# Patient Record
Sex: FEMALE | Race: WHITE | Employment: OTHER | URBAN - METROPOLITAN AREA
[De-identification: names, ages, dates, MRNs, and addresses within clinical notes are randomized per-mention and may not be internally consistent; named-entity substitution may affect disease eponyms.]

---

## 2017-07-14 ENCOUNTER — HOSPITAL ENCOUNTER (OUTPATIENT)
Dept: PHYSICAL THERAPY | Age: 62
Discharge: HOME | End: 2017-07-14
Payer: COMMERCIAL

## 2017-07-14 PROCEDURE — 97163 PT EVAL HIGH COMPLEX 45 MIN: CPT

## 2017-07-14 PROCEDURE — 97110 THERAPEUTIC EXERCISES: CPT

## 2017-07-14 NOTE — PROGRESS NOTES
PT DAILY TREATMENT NOTE     Patient Name: Kamari Oliveros  Date:2017  : 1955  [x]  Patient  Verified  Payor: BLUE CROSS / Plan: [address] [place] [address] / Product Type: PPO /    In time:1200  Out time:100  Total Treatment Time (min): 60  Visit #: 1 of 12    Treatment Area: Displaced supracondylar fracture with intracondylar extension of lower end of left femur, subsequent encounter for closed fracture with routine healing [S72.369S]  Displaced fracture of posterior wall of right acetabulum, subsequent encounter for fracture with routine healing [S34.615M]    SUBJECTIVE  Pain Level (0-10 scale): 5-6  Any medication changes, allergies to medications, adverse drug reactions, diagnosis change, or new procedure performed?: [x] No    [] Yes (see summary sheet for update)  Subjective functional status/changes:   [] No changes reported      OBJECTIVE    Modality rationale:    Min Type Additional Details    [] Estim:  []Unatt       []IFC  []Premod                        []Other:  []w/ice   []w/heat  Position:  Location:    [] Estim: []Att    []TENS instruct  []NMES                    []Other:  []w/US   []w/ice   []w/heat  Position:  Location:    []  Traction: [] Cervical       []Lumbar                       [] Prone          []Supine                       []Intermittent   []Continuous Lbs:  [] before manual  [] after manual    []  Ultrasound: []Continuous   [] Pulsed                           []1MHz   []3MHz W/cm2:  Location:    []  Iontophoresis with dexamethasone         Location: [] Take home patch   [] In clinic    []  Ice     []  heat  []  Ice massage  []  Laser   []  Anodyne Position:  Location:    []  Laser with stim  []  Other:  Position:  Location:    []  Vasopneumatic Device Pressure:       [] lo [] med [] hi   Temperature: [] lo [] med [] hi   [] Skin assessment post-treatment:  []intact []redness- no adverse reaction    []redness  adverse reaction:     50 min [x]Eval []Re-Eval       10 min Therapeutic Exercise:  [] See flow sheet :HEP   Rationale: increase ROM and increase strength to improve the patients ability to prepare for ambulation     With   [] TE   [] TA   [] neuro   [] other: Patient Education: [x] Review HEP    [] Progressed/Changed HEP based on:   [] positioning   [] body mechanics   [] transfers   [] heat/ice application    [] other:      Other Objective/Functional Measures:      Pain Level (0-10 scale) post treatment: 5-6    ASSESSMENT/Changes in Function:     Patient will continue to benefit from skilled PT services to modify and progress therapeutic interventions, address functional mobility deficits, address ROM deficits, address strength deficits, analyze and address soft tissue restrictions, analyze and cue movement patterns, analyze and modify body mechanics/ergonomics, assess and modify postural abnormalities, address imbalance/dizziness and instruct in home and community integration to attain remaining goals.      [x]  See Plan of Care  []  See progress note/recertification  []  See Discharge Summary         Progress towards goals / Updated goals:  See POC    PLAN  []  Upgrade activities as tolerated     [x]  Continue plan of care  []  Update interventions per flow sheet       []  Discharge due to:_  []  Other:_      Armen Montoya, PT 7/14/2017  1:09 PM    Future Appointments  Date Time Provider Lukasz Torres   7/17/2017 4:30  [address] [facility]   7/21/2017 11:30  [address] [facility]   7/24/2017 11:30  [address] [facility]   7/28/2017 11:30  [address] [facility]   7/31/2017 12:00 PM Armen Montoya PT [facility]

## 2017-07-14 NOTE — MR AVS SNAPSHOT
Visit Information Date & Time Provider Department Dept. Phone Encounter #  
 7/14/2017 12:00 PM Polina Briseno,  [address]  [phone] [id number] Upcoming Health Maintenance Date Due Hepatitis C Screening 1955 DTaP/Tdap/Td series (1 - Tdap) 9/16/1975 BREAST CANCER SCRN MAMMOGRAM 9/16/2004 FOBT Q 1 YEAR AGE 50-75 9/16/2004 PAP AKA CERVICAL CYTOLOGY 9/12/2014 ZOSTER VACCINE AGE 60> 9/16/2014 INFLUENZA AGE 9 TO ADULT 8/1/2017 Allergies as of 7/14/2017  Review Complete On: 12/8/2015 By: Todd Maldonado MD  
  
 Severity Noted Reaction Type Reactions Sulfa (Sulfonamide Antibiotics)  12/08/2015    Nausea and Vomiting Current Immunizations  Never Reviewed No immunizations on file. Not reviewed this visit Vitals OB Status Smoking Status Menopause Never Smoker Your Updated Medication List  
  
Notice Cannot display patient medications because the patient has not yet been checked in. To-Do List   
 07/14/2017 12:00 PM  
  Appointment with Polina Briseno, PT at [facility] ([phone]) Introducing [facility]! [address] introduces [employer] patient portal. Now you can access parts of your medical record, email your doctor's office, and request medication refills online. 1. In your internet browser, go to [email or web address] 2. Click on the First Time User? Click Here link in the Sign In box. You will see the New Member Sign Up page. 3. Enter your [employer] Access Code exactly as it appears below. You will not need to use this code after youve completed the sign-up process. If you do not sign up before the expiration date, you must request a new code. · [employer] Access Code: [email or web address] Expires: 10/10/2017 10:55 AM 
 
4.  Enter the last four digits of your Social Security Number (xxxx) and Date of Birth (mm/dd/yyyy) as indicated and click Submit. You will be taken to the next sign-up page. 5. Create a [employer] ID. This will be your [employer] login ID and cannot be changed, so think of one that is secure and easy to remember. 6. Create a [employer] password. You can change your password at any time. 7. Enter your Password Reset Question and Answer. This can be used at a later time if you forget your password. 8. Enter your e-mail address. You will receive e-mail notification when new information is available in [address] 9. Click Sign Up. You can now view and download portions of your medical record. 10. Click the Download Summary menu link to download a portable copy of your medical information. If you have questions, please visit the Frequently Asked Questions section of the [employer] website. Remember, [employer] is NOT to be used for urgent needs. For medical emergencies, dial 911. Now available from your iPhone and Android! Please provide this summary of care documentation to your next provider. If you have any questions after today's visit, please call [phone].
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
## 2017-07-17 ENCOUNTER — APPOINTMENT (OUTPATIENT)
Dept: PHYSICAL THERAPY | Age: 62
End: 2017-07-17
Payer: COMMERCIAL

## 2017-07-21 ENCOUNTER — HOSPITAL ENCOUNTER (OUTPATIENT)
Dept: PHYSICAL THERAPY | Age: 62
Discharge: HOME | End: 2017-07-21
Payer: COMMERCIAL

## 2017-07-21 PROCEDURE — 97110 THERAPEUTIC EXERCISES: CPT

## 2017-07-21 NOTE — PROGRESS NOTES
PT DAILY TREATMENT NOTE     Patient Name: Bell Montez  Date:2017  : 1955  [x]  Patient  Verified  Payor: [employer] / Plan: [address] [place] [address] / Product Type: PPO /    In time:11:30  Out time:12:15  Total Treatment Time (min): 45  Visit #: 2 of 12    Treatment Area: Displaced supracondylar fracture with intracondylar extension of lower end of left femur, subsequent encounter for closed fracture with routine healing [S73.101I]  Displaced fracture of posterior wall of right acetabulum, subsequent encounter for fracture with routine healing [S36.564D]    SUBJECTIVE  Pain Level (0-10 scale): 5  Any medication changes, allergies to medications, adverse drug reactions, diagnosis change, or new procedure performed?: [x] No    [] Yes (see summary sheet for update)  Subjective functional status/changes:   [] No changes reported  I'm having spasms in my legs    OBJECTIVE    45 min Therapeutic Exercise:  [] See flow sheet :   Rationale: increase strength and improve coordination to improve the patients ability to improve WB tolerance at 20%, improve ease of mobility, prepare for increased WB          With   [] TE   [] TA   [] neuro   [] other: Patient Education: [x] Review HEP    [] Progressed/Changed HEP based on:   [] positioning   [] body mechanics   [] transfers   [] heat/ice application    [] other:      Other Objective/Functional Measures: initiated ex program     Pain Level (0-10 scale) post treatment:  4    ASSESSMENT/Changes in Function: first follow-up after eval.  Pt able to stand with UE support in bars for short duration to allow for LE ex's. Therapist provided minimal support to assisit with standing as pt's UE's fatigued.       Patient will continue to benefit from skilled PT services to modify and progress therapeutic interventions, address functional mobility deficits, address ROM deficits, address strength deficits, analyze and address soft tissue restrictions, analyze and cue movement patterns, analyze and modify body mechanics/ergonomics, assess and modify postural abnormalities, address imbalance/dizziness and instruct in home and community integration to attain remaining goals. []  See Plan of Care  []  See progress note/recertification  []  See Discharge Summary         Progress towards goals / Updated goals:  Goal: Patient will be independent and compliant with HEP in order to improve LE strength. Status at last note/certification: Issued and reviewed  Long Term Goals: To be accomplished in 6 weeks:  Goal: Patient will improve FOTO assessment score to 54 in order to indicate improved functional abilities. Status at last note/certification: 35  Goal: Patient will be able to ambulate with walker community distances without pain increasing over 4/10 in order to progress toward normalized gait. Status at last note/certification: unable to ambulate currently  Goal: Patient will improve bilateral LE strength to 5/5 without pain in order to prepare for full weight bearing. Status at last note/certification: Grossly 4+/5 to 5/5 with pain  Goal: Patient will report worst LE pain as 5/10 or less in order to improve sleeping tolerance.   Status at last note/certification: 3/65    PLAN  [x]  Upgrade activities as tolerated     []  Continue plan of care  []  Update interventions per flow sheet       []  Discharge due to:_  []  Other:_      Shari Portillo, RYAN 7/21/2017  12:29 PM    Future Appointments  Date Time Provider Lukasz Torres   7/24/2017 11:30  [address] [facility]   7/28/2017 11:30  [address] [facility]   7/31/2017 12:00 PM Wong4 Ann Marie Philip

## 2017-07-24 ENCOUNTER — HOSPITAL ENCOUNTER (OUTPATIENT)
Dept: PHYSICAL THERAPY | Age: 62
Discharge: HOME | End: 2017-07-24
Payer: COMMERCIAL

## 2017-07-24 PROCEDURE — 97110 THERAPEUTIC EXERCISES: CPT

## 2017-07-24 PROCEDURE — 97112 NEUROMUSCULAR REEDUCATION: CPT

## 2017-07-24 NOTE — PROGRESS NOTES
PT DAILY TREATMENT NOTE     Patient Name: Humberto Herman  Date:2017  : 1955  [x]  Patient  Verified  Payor: BLUE CROSS / Plan: [address] [place] [address] / Product Type: PPO /    In time:11;40  Out time:12;35  Total Treatment Time (min): 54  Visit #: 3 of 12    Treatment Area: Displaced supracondylar fracture with intracondylar extension of lower end of left femur, subsequent encounter for closed fracture with routine healing [S72.469Z]  Displaced fracture of posterior wall of right acetabulum, subsequent encounter for fracture with routine healing [S32.421D]    SUBJECTIVE  Pain Level (0-10 scale): 6  Any medication changes, allergies to medications, adverse drug reactions, diagnosis change, or new procedure performed?: [x] No    [] Yes (see summary sheet for update)  Subjective functional status/changes:   [] No changes reported  I feel good after PT, makes mylegs feel good. OBJECTIVE    45 min Therapeutic Exercise:  [] See flow sheet :   Rationale: increase strength to improve the patients ability to improve ease of transfers, ADL's    10 min Neuromuscular Re-education:  []  See flow sheet :   Rationale: increase strength, improve coordination, improve balance and increase proprioception  to improve the patients ability to increase WB tolerance for gait, transers          With   [] TE   [] TA   [] neuro   [] other: Patient Education: [x] Review HEP    [] Progressed/Changed HEP based on:   [] positioning   [] body mechanics   [] transfers   [] heat/ice application    [] other:      Other Objective/Functional Measures:   Minisquats, lunges   Lowered bars for improved posture and UE support    Pain Level (0-10 scale) post treatment: 0    ASSESSMENT/Changes in Function: Increased WB tolerance in bars with UE support. Able to take 2-3 steps with no reported pain.     Patient will continue to benefit from skilled PT services to modify and progress therapeutic interventions, address functional mobility deficits, address ROM deficits, address strength deficits, analyze and address soft tissue restrictions, analyze and cue movement patterns, analyze and modify body mechanics/ergonomics, assess and modify postural abnormalities, address imbalance/dizziness and instruct in home and community integration to attain remaining goals. []  See Plan of Care  []  See progress note/recertification  []  See Discharge Summary         Progress towards goals / Updated goals:  Goal: Patient will be independent and compliant with HEP in order to improve LE strength. Status at last note/certification: Issued and reviewed  Goal Met  Long Term Goals: To be accomplished in 6 weeks:  Goal: Patient will improve FOTO assessment score to 54 in order to indicate improved functional abilities. Status at last note/certification: 35  Goal: Patient will be able to ambulate with walker community distances without pain increasing over 4/10 in order to progress toward normalized gait. Status at last note/certification: unable to ambulate currently  Goal: Patient will improve bilateral LE strength to 5/5 without pain in order to prepare for full weight bearing. Status at last note/certification: Grossly 4+/5 to 5/5 with pain  Goal: Patient will report worst LE pain as 5/10 or less in order to improve sleeping tolerance.   Status at last note/certification: 3/46    PLAN  [x]  Upgrade activities as tolerated     []  Continue plan of care  []  Update interventions per flow sheet       []  Discharge due to:_  []  Other:_      Usman Knox, RYAN 7/24/2017  11:58 AM    Future Appointments  Date Time Provider Lukasz Torres   7/28/2017 11:30  [address] [facility]   7/31/2017 12:00  Ann Marie Easley

## 2017-07-28 ENCOUNTER — HOSPITAL ENCOUNTER (OUTPATIENT)
Dept: PHYSICAL THERAPY | Age: 62
Discharge: HOME | End: 2017-07-28
Payer: COMMERCIAL

## 2017-07-28 PROCEDURE — 97110 THERAPEUTIC EXERCISES: CPT

## 2017-07-28 PROCEDURE — 97112 NEUROMUSCULAR REEDUCATION: CPT

## 2017-07-28 PROCEDURE — 97530 THERAPEUTIC ACTIVITIES: CPT

## 2017-07-28 NOTE — PROGRESS NOTES
PT DAILY TREATMENT NOTE     Patient Name: Nicholas Griffith  Date:2017  : 1955  [x]  Patient  Verified  Payor: [employer] [place] / Plan: [address] [place] [address] / Product Type: PPO /    In time:11:30  Out time:12:30  Total Treatment Time (min): 60  Visit #: 4 of 12    Treatment Area: Displaced supracondylar fracture with intracondylar extension of lower end of left femur, subsequent encounter for closed fracture with routine healing [S72.710R]  Displaced fracture of posterior wall of right acetabulum, subsequent encounter for fracture with routine healing [S32.421D]    SUBJECTIVE  Pain Level (0-10 scale): 4  Any medication changes, allergies to medications, adverse drug reactions, diagnosis change, or new procedure performed?: [x] No    [] Yes (see summary sheet for update)  Subjective functional status/changes:   [] No changes reported  I've been standing more at home and was able to reach and get a bag of sugar out of the cabinet    OBJECTIVE    30 min Therapeutic Exercise:  [] See flow sheet :   Rationale: increase strength to improve the patients ability to walk and perfrom daily tasks with ease. 15 min Therapeutic Activity:  []  See flow sheet :   Rationale: increase strength, improve coordination and improve balance  to improve the patients ability to progress to use of RW for mobility in home, community, ease of ADL function     15 min Neuromuscular Re-education:  []  See flow sheet :   Rationale: increase strength, improve coordination, improve balance and increase proprioception  to improve the patients ability to weight shift and ambulate with ease,       With   [] TE   [] TA   [] neuro   [] other: Patient Education: [x] Review HEP    [] Progressed/Changed HEP based on:   [] positioning   [] body mechanics   [] transfers   [] heat/ice application    [] other:      Other Objective/Functional Measures: Added: step ups 4 inch with UE support, FW and lateral walking in bars.   Frankie and MSR with EO, 30 seconds  Ambulation with RW 50 feet x 2, 1 rest.  SBA and cues for correct stepping pattern     Pain Level (0-10 scale) post treatment:  0    ASSESSMENT/Changes in Function: Pt progressed to WBAT for ambulating with RW and no noted instability or pain. Initially step to pattern due to unfamiliarity with RW, progressed to step through. Patient will continue to benefit from skilled PT services to modify and progress therapeutic interventions, address functional mobility deficits, address ROM deficits, address strength deficits, analyze and address soft tissue restrictions, analyze and cue movement patterns, analyze and modify body mechanics/ergonomics and assess and modify postural abnormalities to attain remaining goals. []  See Plan of Care  []  See progress note/recertification  []  See Discharge Summary         Progress towards goals / Updated goals:  Goal: Patient will be independent and compliant with HEP in order to improve LE strength. Status at last note/certification: Issued and reviewed  Goal Met  Long Term Goals: To be accomplished in 6 weeks:  Goal: Patient will improve FOTO assessment score to 54 in order to indicate improved functional abilities. Status at last note/certification: 35  Goal: Patient will be able to ambulate with walker community distances without pain increasing over 4/10 in order to progress toward normalized gait. Status at last note/certification: unable to ambulate currently  Goal: Patient will improve bilateral LE strength to 5/5 without pain in order to prepare for full weight bearing. Status at last note/certification: Grossly 4+/5 to 5/5 with pain  Goal: Patient will report worst LE pain as 5/10 or less in order to improve sleeping tolerance.   Status at last note/certification: 2/64    PLAN  [x]  Upgrade activities as tolerated     []  Continue plan of care  []  Update interventions per flow sheet       []  Discharge due to:_  []  Other:_ Bryan Knight, PTA 7/28/2017  12:49 PM    Future Appointments  Date Time Provider Lukasz Torres   7/31/2017 12:00 PM Jack Rutherford, PT [facility]   8/4/2017 11:30  [address] [facility]   8/7/2017 11:30 AM Sandhya Maldonado, PT [facility]   8/11/2017 11:30  [address] [facility]

## 2017-07-31 ENCOUNTER — HOSPITAL ENCOUNTER (OUTPATIENT)
Dept: PHYSICAL THERAPY | Age: 62
Discharge: HOME | End: 2017-07-31
Payer: COMMERCIAL

## 2017-07-31 PROCEDURE — 97110 THERAPEUTIC EXERCISES: CPT

## 2017-07-31 PROCEDURE — 97112 NEUROMUSCULAR REEDUCATION: CPT

## 2017-07-31 NOTE — PROGRESS NOTES
PT DAILY TREATMENT NOTE     Patient Name: Belita Litten  Date:2017  : 1955  [x]  Patient  Verified  Payor: BLUE CROSS / Plan: [address] [place] [address] / Product Type: PPO /    In time:1152  Out time:1240  Total Treatment Time (min): 48  Visit #: 5 of 12    Treatment Area: Displaced supracondylar fracture with intracondylar extension of lower end of left femur, subsequent encounter for closed fracture with routine healing [S72.067E]  Displaced fracture of posterior wall of right acetabulum, subsequent encounter for fracture with routine healing [S32.748D]    SUBJECTIVE  Pain Level (0-10 scale): 4  Any medication changes, allergies to medications, adverse drug reactions, diagnosis change, or new procedure performed?: [x] No    [] Yes (see summary sheet for update)  Subjective functional status/changes:   [] No changes reported  I was a little sore on the way over here. I need to get used to standing up tall and keeping my knees straight.      OBJECTIVE    Modality rationale:    Min Type Additional Details    [] Estim:  []Unatt       []IFC  []Premod                        []Other:  []w/ice   []w/heat  Position:  Location:    [] Estim: []Att    []TENS instruct  []NMES                    []Other:  []w/US   []w/ice   []w/heat  Position:  Location:    []  Traction: [] Cervical       []Lumbar                       [] Prone          []Supine                       []Intermittent   []Continuous Lbs:  [] before manual  [] after manual    []  Ultrasound: []Continuous   [] Pulsed                           []1MHz   []3MHz W/cm2:  Location:    []  Iontophoresis with dexamethasone         Location: [] Take home patch   [] In clinic    []  Ice     []  heat  []  Ice massage  []  Laser   []  Anodyne Position:  Location:    []  Laser with stim  []  Other:  Position:  Location:    []  Vasopneumatic Device Pressure:       [] lo [] med [] hi   Temperature: [] lo [] med [] hi   [] Skin assessment post-treatment: []intact []redness- no adverse reaction    []redness  adverse reaction:     15 min Therapeutic Exercise:  [x] See flow sheet :   Rationale: increase ROM and increase strength to improve the patients ability to perform daily tasks    33 min Neuromuscular Re-education:  [x]  See flow sheet :   Rationale: increase strength, improve coordination, improve balance and increase proprioception  to improve the patients ability to improve LE stability with standing, walking      With   [] TE   [] TA   [] neuro   [] other: Patient Education: [x] Review HEP    [] Progressed/Changed HEP based on:   [] positioning   [] body mechanics   [] transfers   [] heat/ice application    [] other:      Other Objective/Functional Measures: added manually resisted TKE     Pain Level (0-10 scale) post treatment: 4    ASSESSMENT/Changes in Function: Patient reports increased Left knee pain with standing on Left LE with UE support. Unable to completely extend left knee in standing during TKE (some swelling noted). Patient remains very motivated. Able to ambulate about 150 feet with Rolling walker without stopping. Patient will continue to benefit from skilled PT services to modify and progress therapeutic interventions, address functional mobility deficits, address ROM deficits, address strength deficits, analyze and address soft tissue restrictions, analyze and cue movement patterns, analyze and modify body mechanics/ergonomics, assess and modify postural abnormalities, address imbalance/dizziness and instruct in home and community integration to attain remaining goals. []  See Plan of Care  []  See progress note/recertification  []  See Discharge Summary         Progress towards goals / Updated goals:  Goal: Patient will be independent and compliant with HEP in order to improve LE strength.   Status at last note/certification: Issued and reviewed  Goal Met  Long Term Goals: To be accomplished in 6 weeks:  Goal: Patient will improve FOTO assessment score to 54 in order to indicate improved functional abilities. Status at last note/certification: 35  Current status: Progressing, 42  Goal: Patient will be able to ambulate with walker community distances without pain increasing over 4/10 in order to progress toward normalized gait. Status at last note/certification: unable to ambulate currently  Current status: Progressing, gently progressing in weight bearing but demonstrates improved standing tolerance  Goal: Patient will improve bilateral LE strength to 5/5 without pain in order to prepare for full weight bearing. Status at last note/certification: Grossly 4+/5 to 5/5 with pain  Current status: Progressing, remains painful  Goal: Patient will report worst LE pain as 5/10 or less in order to improve sleeping tolerance.   Status at last note/certification: 0/17  Current status: Progressing, 5-6/10    PLAN  [x]  Upgrade activities as tolerated     [x]  Continue plan of care  []  Update interventions per flow sheet       []  Discharge due to:_  []  Other:_      Fausto Davis PT 7/31/2017  7:17 AM    Future Appointments  Date Time Provider Lukasz Torres   7/31/2017 12:00 PM Fausto Davis, PT [facility]   8/4/2017 11:30  [address] [facility]   8/7/2017 11:30 AM Sandhya Maldonado, PT [facility]   8/11/2017 11:30  [address] [facility]

## 2017-08-04 ENCOUNTER — HOSPITAL ENCOUNTER (OUTPATIENT)
Dept: PHYSICAL THERAPY | Age: 62
Discharge: HOME | End: 2017-08-04
Payer: COMMERCIAL

## 2017-08-04 PROCEDURE — 97110 THERAPEUTIC EXERCISES: CPT

## 2017-08-04 NOTE — PROGRESS NOTES
PT DAILY TREATMENT NOTE - [facility] 3-16    Patient Name: Reva Bro  Date:2017  : 1955  [x]  Patient  Verified  Payor: BLUE CROSS / Plan: [address] [place] [address] / Product Type: PPO /    In time:11;30  Out time: 12;15  Total Treatment Time (min): 45  Visit #: 4 of 12    Treatment Area: Displaced supracondylar fracture with intracondylar extension of lower end of left femur, subsequent encounter for closed fracture with routine healing [S72.451S]  Displaced fracture of posterior wall of right acetabulum, subsequent encounter for fracture with routine healing [S32.421D]    SUBJECTIVE  Pain Level (0-10 scale): 7  Any medication changes, allergies to medications, adverse drug reactions, diagnosis change, or new procedure performed?: [x] No    [] Yes (see summary sheet for update)  Subjective functional status/changes:   [] No changes reported  I did too much walking the other day, and then I sat in churh for 3 hours. My pain is much more now. OBJECTIVE    45 min Therapeutic Exercise:  [] See flow sheet :   Rationale: increase strength to improve the patients ability to walk with RW and progress to LRAD, perform ADL's      With   [] TE   [] TA   [] neuro   [] other: Patient Education: [x] Review HEP    [] Progressed/Changed HEP based on:   [] positioning   [] body mechanics   [] transfers   [] heat/ice application    [] other:      Other Objective/Functional Measures:    Ambulation in bars as pt felt more comfortable due to pain in legs today. Pain Level (0-10 scale) post treatment: 6    ASSESSMENT/Changes in Function:  Despite pain, pt able to do all ex's in parrallel bars. Requested to walk in bars versus walker.     Patient will continue to benefit from skilled PT services to modify and progress therapeutic interventions, address functional mobility deficits, address ROM deficits, address strength deficits, analyze and address soft tissue restrictions, analyze and cue movement patterns, analyze and modify body mechanics/ergonomics, assess and modify postural abnormalities, address imbalance/dizziness and instruct in home and community integration to attain remaining goals. []  See Plan of Care  []  See progress note/recertification  []  See Discharge Summary         Progress towards goals / Updated goals:  Goal: Patient will be independent and compliant with HEP in order to improve LE strength. Status at last note/certification: Issued and reviewed  Goal Met  Long Term Goals: To be accomplished in 6 weeks:  Goal: Patient will improve FOTO assessment score to 54 in order to indicate improved functional abilities. Status at last note/certification: 35  Current status: Progressing, 42  Goal: Patient will be able to ambulate with walker community distances without pain increasing over 4/10 in order to progress toward normalized gait. Status at last note/certification: unable to ambulate currently  Current status: Progressing, gently progressing in weight bearing but demonstrates improved standing tolerance  Goal: Patient will improve bilateral LE strength to 5/5 without pain in order to prepare for full weight bearing. Status at last note/certification: Grossly 4+/5 to 5/5 with pain  Current status: Progressing, remains painful  Goal: Patient will report worst LE pain as 5/10 or less in order to improve sleeping tolerance.   Status at last note/certification: 9/65  Current status: Progressing, 5-6/10     PLAN  [x]  Upgrade activities as tolerated     []  Continue plan of care  []  Update interventions per flow sheet       []  Discharge due to:_  []  Other:_      Shari Portillo, RYAN 8/4/2017  11:56 AM

## 2017-08-07 ENCOUNTER — HOSPITAL ENCOUNTER (OUTPATIENT)
Dept: PHYSICAL THERAPY | Age: 62
Discharge: HOME | End: 2017-08-07
Payer: COMMERCIAL

## 2017-08-07 PROCEDURE — 97110 THERAPEUTIC EXERCISES: CPT

## 2017-08-07 PROCEDURE — 97112 NEUROMUSCULAR REEDUCATION: CPT

## 2017-08-07 NOTE — PROGRESS NOTES
PT DAILY TREATMENT NOTE     Patient Name: Franky Nolasco  [facility]HU:[id number]  : 1955  [x]  Patient  Verified  Payor: JAQUAN Buxton / Plan: [address] [place] [address] / Product Type: PPO /    In time:11:33  Out time:12:08  Total Treatment Time (min): 35  Visit #: 7 of 12    Treatment Area: Displaced supracondylar fracture with intracondylar extension of lower end of left femur, subsequent encounter for closed fracture with routine healing [S72.465G]  Displaced fracture of posterior wall of right acetabulum, subsequent encounter for fracture with routine healing [S32.421D]    SUBJECTIVE  Pain Level (0-10 scale): 310  Any medication changes, allergies to medications, adverse drug reactions, diagnosis change, or new procedure performed?: [x] No    [] Yes (see summary sheet for update)  Subjective functional status/changes:   [] No changes reported  \"I feel much better than when I was here last time. I did too much standing last week when I was trying to color my hair in the bathroom. I didn't really stand much over the weekend so I feel better today. \"    OBJECTIVE    25 min Therapeutic Exercise:  [] See flow sheet :   Rationale: increase strength to improve the patients ability to increase standing tolerance for ease with ADLs    10 min Neuromuscular Re-education:  []  See flow sheet : gait with RW   Rationale: improve coordination and improve balance  to improve the patients ability to work towards amb without need for wheelchair          With   [] TE   [] TA   [] neuro   [] other: Patient Education: [x] Review HEP    [] Progressed/Changed HEP based on:   [] positioning   [] body mechanics   [] transfers   [] heat/ice application    [] other:      Other Objective/Functional Measures: Continued with progressed program.  Pt able to resume gait using RW today.      Pain Level (0-10 scale) post treatment: 0/10    ASSESSMENT/Changes in Function: Pt exhibited proper sequencing of gait and even WB using RW with SBA.  Pt noted continued movement loosens joints and decreases pain. Patient will continue to benefit from skilled PT services to address functional mobility deficits, address ROM deficits, address strength deficits, analyze and address soft tissue restrictions, analyze and cue movement patterns, analyze and modify body mechanics/ergonomics, assess and modify postural abnormalities, address imbalance/dizziness and instruct in home and community integration to attain remaining goals. []  See Plan of Care  []  See progress note/recertification  []  See Discharge Summary         Progress towards goals / Updated goals:  Short Term Goals: To be accomplished in 1 week:  Goal: Patient will be independent and compliant with HEP in order to improve LE strength. Status at last note/certification: Issued and reviewed  Current stastus: met  Long Term Goals: To be accomplished in 6 weeks:  Goal: Patient will improve FOTO assessment score to 54 in order to indicate improved functional abilities. Status at last note/certification: 35  Current status: progressing - FOTO 42 pts  Goal: Patient will be able to ambulate with walker community distances without pain increasing over 4/10 in order to progress toward normalized gait. Status at last note/certification: unable to ambulate currently  Current status: progressing - able to amb x 150 ft using RW with SBA  Goal: Patient will improve bilateral LE strength to 5/5 without pain in order to prepare for full weight bearing. Status at last note/certification: Grossly 4+/5 to 5/5 with pain  Current status: progressing, remains painful  Goal: Patient will report worst LE pain as 5/10 or less in order to improve sleeping tolerance.   Status at last note/certification: 0/85  Current status: progressing - 5-6/10    PLAN  [x]  Upgrade activities as tolerated     []  Continue plan of care  []  Update interventions per flow sheet       []  Discharge due to:_  []  Other:_      Deisy Goldsmith JUJU Maldonado 8/7/2017  11:54 AM    Future Appointments  Date Time Provider Lukasz Torres   8/11/2017 11:30  [address] [facility]

## 2017-08-11 ENCOUNTER — HOSPITAL ENCOUNTER (OUTPATIENT)
Dept: PHYSICAL THERAPY | Age: 62
Discharge: HOME | End: 2017-08-11
Payer: COMMERCIAL

## 2017-08-11 PROCEDURE — 97112 NEUROMUSCULAR REEDUCATION: CPT

## 2017-08-11 PROCEDURE — 97110 THERAPEUTIC EXERCISES: CPT
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
## 2017-08-11 NOTE — PROGRESS NOTES
PT DAILY TREATMENT NOTE     Patient Name: Magan Simmons  Date:2017  : 1955  [x]  Patient  Verified  Payor: BLUE CROSS / Plan: [address] [place] [address] / Product Type: PPO /    In time:11;30  Out time: 12;15  Total Treatment Time (min): 45  Visit #: 8 of 12    Treatment Area: Displaced supracondylar fracture with intracondylar extension of lower end of left femur, subsequent encounter for closed fracture with routine healing [S72.468L]  Displaced fracture of posterior wall of right acetabulum, subsequent encounter for fracture with routine healing [S32.421D]    SUBJECTIVE  Pain Level (0-10 scale):  5  Any medication changes, allergies to medications, adverse drug reactions, diagnosis change, or new procedure performed?: [x] No    [] Yes (see summary sheet for update)  Subjective functional status/changes:   [] No changes reported  Do you think I can get a walker for home? I try to walk more at home  OBJECTIVE    35 min Therapeutic Exercise:  [] See flow sheet :   Rationale: increase strength to improve the patients ability to improve standing/walking tolerance       10 min Neuromuscular Re-education:  []  See flow sheet :   Rationale: increase strength, improve coordination, improve balance and increase proprioception  to improve the patients ability to walk with LRAD in home and community          With   [] TE   [] TA   [] neuro   [] other: Patient Education: [x] Review HEP    [] Progressed/Changed HEP based on:   [] positioning   [] body mechanics   [] transfers   [] heat/ice application    [] other:      Other Objective/Functional Measures:   60-65% improved   Gains: able to stand for longer durations ( < 5 minutes,  Has not pushed self yet to determine max duration) with decreased UE support. Decreased LE pain, stiffness. Pain 7/10 worst usually at night   Deficits: limited standing/walking tolerance.     Pt Goal:  Increase walking with RW for greater independence in home, community    Pain Level (0-10 scale) post treatment:  0    ASSESSMENT/Changes in Function: improved ease of mobility with rollator in clinic. Patient will continue to benefit from skilled PT services to modify and progress therapeutic interventions, address functional mobility deficits, address ROM deficits, address strength deficits, analyze and address soft tissue restrictions, analyze and cue movement patterns, analyze and modify body mechanics/ergonomics, assess and modify postural abnormalities, address imbalance/dizziness and instruct in home and community integration to attain remaining goals. []  See Plan of Care  []  See progress note/recertification  []  See Discharge Summary         Progress towards goals / Updated goals:  Goal: Patient will be independent and compliant with HEP in order to improve LE strength. Status at last note/certification: Issued and reviewed  Current stastus: met  Long Term Goals: To be accomplished in 6 weeks:  Goal: Patient will improve FOTO assessment score to 54 in order to indicate improved functional abilities. Status at last note/certification: 35  Current status: progressing - FOTO 42 pts  Goal: Patient will be able to ambulate with walker community distances without pain increasing over 4/10 in order to progress toward normalized gait. Status at last note/certification: unable to ambulate currently  Current status: progressing - able to amb with rollater 200 feet x 2 in clinic  SBA  Goal: Patient will improve bilateral LE strength to 5/5 without pain in order to prepare for full weight bearing. Status at last note/certification: Grossly 4+/5 to 5/5 with pain  Current status: progressing, remains painful  Goal: Patient will report worst LE pain as 5/10 or less in order to improve sleeping tolerance.   Status at last note/certification: 2/47  Current status: progressing - 5-6/10, during the day,  7/10 night    PLAN  [x]  Upgrade activities as tolerated     [] Continue plan of care  []  Update interventions per flow sheet       []  Discharge due to:_  []  Other:_      Matthew Torres, RYAN 8/11/2017  11:38 AM    No future appointments.

## 2017-08-14 ENCOUNTER — HOSPITAL ENCOUNTER (OUTPATIENT)
Dept: PHYSICAL THERAPY | Age: 62
Discharge: HOME | End: 2017-08-14
Payer: COMMERCIAL

## 2017-08-14 PROCEDURE — 97112 NEUROMUSCULAR REEDUCATION: CPT

## 2017-08-14 PROCEDURE — 97110 THERAPEUTIC EXERCISES: CPT

## 2017-08-14 NOTE — PROGRESS NOTES
PT DAILY TREATMENT NOTE     Patient Name: Delmy Hammond  Date:2017  : 1955  [x]  Patient  Verified  Payor: BLUE CROSS / Plan: [address] [place] [address] / Product Type: PPO /    In time:12;10  Out time:1;00  Total Treatment Time (min): 50  Visit #: 1 of 10    Treatment Area: Displaced supracondylar fracture with intracondylar extension of lower end of left femur, subsequent encounter for closed fracture with routine healing [S72.469L]  Displaced fracture of posterior wall of right acetabulum, subsequent encounter for fracture with routine healing [S32.421D]    SUBJECTIVE  Pain Level (0-10 scale): 0  Any medication changes, allergies to medications, adverse drug reactions, diagnosis change, or new procedure performed?: [x] No    [] Yes (see summary sheet for update)  Subjective functional status/changes:   [] No changes reported  I got a rollator. It's big for the apartment, but I like it. OBJECTIVE    35 min Therapeutic Exercise:  [] See flow sheet :   Rationale: increase strength to improve the patients ability to improve standing/walking tolerance     15 min Neuromuscular Re-education:  []  See flow sheet :   Rationale: increase strength, improve coordination, improve balance and increase proprioception  to improve the patients ability to increase activity tolerance, for functional mobility          With   [] TE   [] TA   [] neuro   [] other: Patient Education: [x] Review HEP    [] Progressed/Changed HEP based on:   [] positioning   [] body mechanics   [] transfers   [] heat/ice application    [] other:      Other Objective/Functional Measures: Added tandem, lateral stepping, marching in bars     Pain Level (0-10 scale) post treatment:  1    ASSESSMENT/Changes in Function: good static balance in 1/2 stance with no LOB.     Patient will continue to benefit from skilled PT services to modify and progress therapeutic interventions, address functional mobility deficits, address ROM deficits, address strength deficits, analyze and address soft tissue restrictions, analyze and cue movement patterns, analyze and modify body mechanics/ergonomics, assess and modify postural abnormalities, address imbalance/dizziness and instruct in home and community integration to attain remaining goals. []  See Plan of Care  []  See progress note/recertification  []  See Discharge Summary         Progress towards goals / Updated goals:  Goal: Patient will be independent and compliant with HEP in order to improve LE strength. Status at last note/certification: Issued and reviewed  Current stastus: met  Long Term Goals: To be accomplished in 6 weeks:  Goal: Patient will improve FOTO assessment score to 54 in order to indicate improved functional abilities. Status at last note/certification: 35  Current status: progressing - FOTO 42 pts  Goal: Patient will be able to ambulate with walker community distances without pain increasing over 4/10 in order to progress toward normalized gait. Status at last note/certification: unable to ambulate currently  Current status: progressing - able to amb with rollater 200 feet x 2 in clinic  SBA  Goal: Patient will improve bilateral LE strength to 5/5 without pain in order to prepare for full weight bearing. Status at last note/certification: Grossly 4+/5 to 5/5 with pain  Current status: progressing, remains painful and grossly 4+/5  Goal: Patient will report worst LE pain as 5/10 or less in order to improve sleeping tolerance.   Status at last note/certification: 4/86  Current status: progressing - 5-6/10, during the day,  7/10 night    PLAN  []  Upgrade activities as tolerated     []  Continue plan of care  []  Update interventions per flow sheet       []  Discharge due to:_  []  Other:_      Clara Calvo PTA 8/14/2017  12:23 PM    Future Appointments  Date Time Provider Lukasz Torres   8/18/2017 11:00  [address] [facility]   8/21/2017 10:30 AM Sherwood Closs A Jude Dietrich [facility]   8/25/2017 11:30 AM Carmen Luke [facility]   8/28/2017 2:00 PM Yoni Stuart, PT [facility]   9/1/2017 12:00 PM Yoni Stuart PT [facility]

## 2017-08-18 ENCOUNTER — HOSPITAL ENCOUNTER (OUTPATIENT)
Dept: PHYSICAL THERAPY | Age: 62
Discharge: HOME | End: 2017-08-18
Payer: COMMERCIAL

## 2017-08-18 PROCEDURE — 97110 THERAPEUTIC EXERCISES: CPT

## 2017-08-18 PROCEDURE — 97112 NEUROMUSCULAR REEDUCATION: CPT

## 2017-08-18 NOTE — PROGRESS NOTES
PT DAILY TREATMENT NOTE     Patient Name: Rusty Tenorio  Date:2017  : 1955  [x]  Patient  Verified  Payor: [employer] [place] / Plan: [address] [place] [address] / Product Type: PPO /    In time:1112  Out time:1215  Total Treatment Time (min): 63  Visit #: 2 of 10    Treatment Area: Displaced supracondylar fracture with intracondylar extension of lower end of left femur, subsequent encounter for closed fracture with routine healing [S72.943Y]  Displaced fracture of posterior wall of right acetabulum, subsequent encounter for fracture with routine healing [S30.922D]    SUBJECTIVE  Pain Level (0-10 scale): 6  Any medication changes, allergies to medications, adverse drug reactions, diagnosis change, or new procedure performed?: [x] No    [] Yes (see summary sheet for update)  Subjective functional status/changes:   [] No changes reported  The doctor said my left leg is healing slowly.      OBJECTIVE    Modality rationale:    Min Type Additional Details    [] Estim:  []Unatt       []IFC  []Premod                        []Other:  []w/ice   []w/heat  Position:  Location:    [] Estim: []Att    []TENS instruct  []NMES                    []Other:  []w/US   []w/ice   []w/heat  Position:  Location:    []  Traction: [] Cervical       []Lumbar                       [] Prone          []Supine                       []Intermittent   []Continuous Lbs:  [] before manual  [] after manual    []  Ultrasound: []Continuous   [] Pulsed                           []1MHz   []3MHz W/cm2:  Location:    []  Iontophoresis with dexamethasone         Location: [] Take home patch   [] In clinic    []  Ice     []  heat  []  Ice massage  []  Laser   []  Anodyne Position:  Location:    []  Laser with stim  []  Other:  Position:  Location:    []  Vasopneumatic Device Pressure:       [] lo [] med [] hi   Temperature: [] lo [] med [] hi   [] Skin assessment post-treatment:  []intact []redness- no adverse reaction    []redness  adverse reaction:     40 min Therapeutic Exercise:  [x] See flow sheet :   Rationale: increase ROM and increase strength to improve the patients ability to improve stand/ amb tolerance    23 min Neuromuscular Re-education:  [x]  See flow sheet :   Rationale: increase strength, improve coordination, improve balance and increase proprioception  to improve the patients ability to improve LE stability during ambulation and mobility       With   [] TE   [] TA   [] neuro   [] other: Patient Education: [x] Review HEP    [] Progressed/Changed HEP based on:   [] positioning   [] body mechanics   [] transfers   [] heat/ice application    [] other:      Other Objective/Functional Measures: added gentle minisquats, Right SLS     Pain Level (0-10 scale) post treatment: 6    ASSESSMENT/Changes in Function: Updated order from MD follow up yesterday; Right LE WBAT, Left LE 50% (slow healing of Left LE). No SL Left activities performed but able to perform Left LE activities standing on Right LE with finger tip touch/one UE support. Patient will continue to benefit from skilled PT services to modify and progress therapeutic interventions, address functional mobility deficits, address ROM deficits, address strength deficits, analyze and address soft tissue restrictions, analyze and cue movement patterns, analyze and modify body mechanics/ergonomics, assess and modify postural abnormalities, address imbalance/dizziness and instruct in home and community integration to attain remaining goals. []  See Plan of Care  []  See progress note/recertification  []  See Discharge Summary         Progress towards goals / Updated goals:  Goal: Patient will improve FOTO assessment score to 54 in order to indicate improved functional abilities.   Status at last note/certification: 35  Current status: progressing 50  Goal: Patient will be able to ambulate with walker community distances without pain increasing over 4/10 in order to progress toward normalized gait. Status at last note/certification: unable to ambulate currently  Current status: progressing, able to ambulate with walker but with pain/limitations in Left LE  Goal: Patient will improve bilateral LE strength to 5/5 without pain in order to prepare for full weight bearing. Status at last note/certification: Grossly 4+/5 to 5/5 with pain  Current status: progressing well, grossly 5/5 with some groin pain  Goal: Patient will report worst LE pain as 5/10 or less in order to improve sleeping tolerance.   Status at last note/certification: 9/62  Current status: progressing- 7/10    PLAN  [x]  Upgrade activities as tolerated     [x]  Continue plan of care  []  Update interventions per flow sheet       []  Discharge due to:_  []  Other:_      Jack Rutherford PT 8/18/2017  11:01 AM    Future Appointments  Date Time Provider Lukasz Torres   8/21/2017 10:30 AM Jack Rutherford PT [facility]   8/25/2017 11:30 AM Debra Dinh [facility]   8/28/2017 2:00 PM Rebel [facility]   9/1/2017 12:00 PM Gómez Smith

## 2017-08-21 ENCOUNTER — HOSPITAL ENCOUNTER (OUTPATIENT)
Dept: PHYSICAL THERAPY | Age: 62
End: 2017-08-21
Payer: COMMERCIAL

## 2017-08-24 ENCOUNTER — APPOINTMENT (OUTPATIENT)
Dept: PHYSICAL THERAPY | Age: 62
End: 2017-08-24
Payer: COMMERCIAL

## 2017-08-25 ENCOUNTER — HOSPITAL ENCOUNTER (OUTPATIENT)
Dept: PHYSICAL THERAPY | Age: 62
Discharge: HOME | End: 2017-08-25
Payer: COMMERCIAL

## 2017-08-25 PROCEDURE — 97110 THERAPEUTIC EXERCISES: CPT

## 2017-08-25 PROCEDURE — 97112 NEUROMUSCULAR REEDUCATION: CPT

## 2017-08-25 NOTE — PROGRESS NOTES
PT DAILY TREATMENT NOTE     Patient Name: Reva Bro  Date:2017  : 1955  [x]  Patient  Verified  Payor: BLUE CROSS / Plan: [address] [place] [address] / Product Type: PPO /    In time:1115  Out time:1203  Total Treatment Time (min): 48  Visit #: 3 of 10    Treatment Area: Displaced supracondylar fracture with intracondylar extension of lower end of left femur, subsequent encounter for closed fracture with routine healing [S72.331R]  Displaced fracture of posterior wall of right acetabulum, subsequent encounter for fracture with routine healing [S32.808D]    SUBJECTIVE  Pain Level (0-10 scale): 4  Any medication changes, allergies to medications, adverse drug reactions, diagnosis change, or new procedure performed?: [x] No    [] Yes (see summary sheet for update)  Subjective functional status/changes:   [] No changes reported  I just took some painkillers. I can tell my legs are getting stronger. But when I walk I'm not standing up straight.      OBJECTIVE    Modality rationale:    Min Type Additional Details    [] Estim:  []Unatt       []IFC  []Premod                        []Other:  []w/ice   []w/heat  Position:  Location:    [] Estim: []Att    []TENS instruct  []NMES                    []Other:  []w/US   []w/ice   []w/heat  Position:  Location:    []  Traction: [] Cervical       []Lumbar                       [] Prone          []Supine                       []Intermittent   []Continuous Lbs:  [] before manual  [] after manual    []  Ultrasound: []Continuous   [] Pulsed                           []1MHz   []3MHz W/cm2:  Location:    []  Iontophoresis with dexamethasone         Location: [] Take home patch   [] In clinic    []  Ice     []  heat  []  Ice massage  []  Laser   []  Anodyne Position:  Location:    []  Laser with stim  []  Other:  Position:  Location:    []  Vasopneumatic Device Pressure:       [] lo [] med [] hi   Temperature: [] lo [] med [] hi   [] Skin assessment post-treatment:  []intact []redness- no adverse reaction    []redness  adverse reaction:     15 min Therapeutic Exercise:  [x] See flow sheet :   Rationale: increase ROM and increase strength to improve the patients ability to improve stand/amb tolerance    32 min Neuromuscular Re-education:  [x]  See flow sheet :   Rationale: increase strength, improve coordination, improve balance and increase proprioception  to improve the patients ability to improve stability with ambulation, improve LE stability     With   [] TE   [] TA   [] neuro   [] other: Patient Education: [x] Review HEP    [] Progressed/Changed HEP based on:   [] positioning   [] body mechanics   [] transfers   [] heat/ice application    [] other:      Other Objective/Functional Measures: completed exercises per flow sheet     Pain Level (0-10 scale) post treatment: 3-4    ASSESSMENT/Changes in Function: Right posterior thigh tender so held Right TKE. Able to perform Hip x 3 Right leg, standing on Left. Able to ambulate several laps with Rollator in clinic with decent posture. Patient will continue to benefit from skilled PT services to modify and progress therapeutic interventions, address functional mobility deficits, address ROM deficits, address strength deficits, analyze and address soft tissue restrictions, analyze and cue movement patterns, analyze and modify body mechanics/ergonomics, assess and modify postural abnormalities, address imbalance/dizziness and instruct in home and community integration to attain remaining goals. []  See Plan of Care  []  See progress note/recertification  []  See Discharge Summary         Progress towards goals / Updated goals:  Goal: Patient will improve FOTO assessment score to 54 in order to indicate improved functional abilities.   Status at last note/certification: 35  Current status: Progressing 50  Goal: Patient will be able to ambulate with walker community distances without pain increasing over 4/10 in order to progress toward normalized gait. Status at last note/certification: unable to ambulate currently  Current status: Progressing, decreased pain today with ambulation several laps in clinic  Goal: Patient will improve bilateral LE strength to 5/5 without pain in order to prepare for full weight bearing. Status at last note/certification: Grossly 4+/5 to 5/5 with pain  Current status: Progressing well, grossly 5/5 with some continued discomfort  Goal: Patient will report worst LE pain as 5/10 or less in order to improve sleeping tolerance.   Status at last note/certification: 1/38  Current status: Progressing, 6- 7/10    PLAN  [x]  Upgrade activities as tolerated     [x]  Continue plan of care  []  Update interventions per flow sheet       []  Discharge due to:_  []  Other:_      Kavita Jacobs, PT 8/25/2017  11:11 AM    Future Appointments  Date Time Provider Lukasz Torres   8/25/2017 11:30 AM Kavita Jacobs, PT [facility]   8/28/2017 10:30 AM Sandhya Maldonado, PT [facility]   9/1/2017 12:00 PM Wong[facility] Ann Marie [place]

## 2017-08-28 ENCOUNTER — APPOINTMENT (OUTPATIENT)
Dept: PHYSICAL THERAPY | Age: 62
End: 2017-08-28
Payer: COMMERCIAL

## 2017-08-31 ENCOUNTER — HOSPITAL ENCOUNTER (OUTPATIENT)
Dept: PHYSICAL THERAPY | Age: 62
Discharge: HOME | End: 2017-08-31
Payer: COMMERCIAL

## 2017-08-31 PROCEDURE — 97116 GAIT TRAINING THERAPY: CPT

## 2017-08-31 PROCEDURE — 97110 THERAPEUTIC EXERCISES: CPT

## 2017-08-31 NOTE — PROGRESS NOTES
PT DAILY TREATMENT NOTE 3-16    Patient Name: Rusty Tenorio  Date:2017  : 1955  [x]  Patient  Verified  Payor: BLUE CROSS / Plan: [address] [place] [address] / Product Type: PPO /    In time:11:00  Out time:11:58  Total Treatment Time (min): 62  Visit #: 4 of 10    Treatment Area: Displaced supracondylar fracture with intracondylar extension of lower end of left femur, subsequent encounter for closed fracture with routine healing [S72.514F]  Displaced fracture of posterior wall of right acetabulum, subsequent encounter for fracture with routine healing [S32.421D]    SUBJECTIVE  Pain Level (0-10 scale): 4  Any medication changes, allergies to medications, adverse drug reactions, diagnosis change, or new procedure performed?: [x] No    [] Yes (see summary sheet for update)  Subjective functional status/changes:   [] No changes reported  Patient reports that her left knee swelled up significantly after last treatment, patient iced frequently and reports that swelling has gone down. Patient informs PTA that she wants to continue to get stronger and place weight through left leg, but is fearful of the slow healing process of left femur.       OBJECTIVE  Modality rationale: decrease edema and decrease pain to improve the patients ability to ease soreness after therapy   Min Type Additional Details    [] Estim:  []Unatt       []IFC  []Premod                        []Other:  []w/ice   []w/heat  Position:  Location:    [] Estim: []Att    []TENS instruct  []NMES                    []Other:  []w/US   []w/ice   []w/heat  Position:  Location:    []  Traction: [] Cervical       []Lumbar                       [] Prone          []Supine                       []Intermittent   []Continuous Lbs:  [] before manual  [] after manual    []  Ultrasound: []Continuous   [] Pulsed                           []1MHz   []3MHz Location:  W/cm2:    []  Iontophoresis with dexamethasone         Location: [] Take home patch   [] In clinic   10 [x]  Ice     []  heat  []  Ice massage  []  Laser   []  Anodyne Position: supine with wedge  Location: left knee    []  Laser with stim  []  Other: Position:  Location:    []  Vasopneumatic Device Pressure:       [] lo [] med [] hi   Temperature: [] lo [] med [] hi   [x] Skin assessment post-treatment:  [x]intact []redness- no adverse reaction    []redness  adverse reaction:     40 min Therapeutic Exercise:  [x] See flow sheet :   Rationale: increase ROM, increase strength and improve coordination to improve the patients ability to increase ease with standing/ambulation tolerance    8 min Gait Training:  x5 laps around therapy gym at [place] [facility] with SBA   Rationale: to increase tolerance for ambulation           With   [] TE   [] TA   [] neuro   [] other: Patient Education: [x] Review HEP    [] Progressed/Changed HEP based on:   [] positioning   [] body mechanics   [] transfers   [] heat/ice application    [] other:      Other Objective/Functional Measures:   Visible increased edema left proximal lateral knee joint line>right knee  Cues to prevent extensor lag with SLR    Pain Level (0-10 scale) post treatment: 0    ASSESSMENT/Changes in Function:   Patient with good tolerance to session today even with initial reports of increased left knee edema after last session. Noted visible edema along left proximal lateral knee joint line. Patient is able to complete exercises per flowsheet with reported decrease in pain post session. Will continue to focus on B LE strengthening adhering to weight bear precautions.    Patient will continue to benefit from skilled PT services to modify and progress therapeutic interventions, address functional mobility deficits, address ROM deficits, address strength deficits, analyze and address soft tissue restrictions, analyze and cue movement patterns, analyze and modify body mechanics/ergonomics, assess and modify postural abnormalities, address imbalance/dizziness and instruct in home and community integration to attain remaining goals. []  See Plan of Care  []  See progress note/recertification  []  See Discharge Summary         Progress towards goals / Updated goals:  Goal: Patient will improve FOTO assessment score to 54 in order to indicate improved functional abilities. Status at last note/certification: 35  Current status: Progressing 50  Goal: Patient will be able to ambulate with walker community distances without pain increasing over 4/10 in order to progress toward normalized gait. Status at last note/certification: unable to ambulate currently  Current status: Progressing, decreased pain today with ambulation several laps in clinic  Goal: Patient will improve bilateral LE strength to 5/5 without pain in order to prepare for full weight bearing. Status at last note/certification: Grossly 4+/5 to 5/5 with pain  Current status: Progressing well, grossly 5/5 with some continued discomfort  Goal: Patient will report worst LE pain as 5/10 or less in order to improve sleeping tolerance.   Status at last note/certification: 1/89  Current status: Progressing, 6- 7/10    PLAN  []  Upgrade activities as tolerated     [x]  Continue plan of care  []  Update interventions per flow sheet       []  Discharge due to:_  []  Other:_      [place] 8/31/2017  10:51 AM    Future Appointments  Date Time Provider Lukasz Torres   8/31/2017 11:00 AM Rebel Addison [facility]   9/1/2017 12:00 PM Hamilton Leung PT [facility]

## 2017-09-01 ENCOUNTER — HOSPITAL ENCOUNTER (OUTPATIENT)
Dept: PHYSICAL THERAPY | Age: 62
Discharge: HOME | End: 2017-09-01
Payer: COMMERCIAL

## 2017-09-01 PROCEDURE — 97112 NEUROMUSCULAR REEDUCATION: CPT

## 2017-09-01 PROCEDURE — 97110 THERAPEUTIC EXERCISES: CPT

## 2017-09-01 NOTE — PROGRESS NOTES
PT DAILY TREATMENT NOTE 3-    Patient Name: Magan Simmons  Date:2017  : 1955  [x]  Patient  Verified  Payor: JAQUAN Shoup / Plan: [address] [place] [address] / Product Type: PPO /    In time:12:08  Out time:12:55  Total Treatment Time (min):47  Visit #: 5 of 10    Treatment Area: Displaced supracondylar fracture with intracondylar extension of lower end of left femur, subsequent encounter for closed fracture with routine healing [S72.844A]  Displaced fracture of posterior wall of right acetabulum, subsequent encounter for fracture with routine healing [S32.421D]    SUBJECTIVE  Pain Level (0-10 scale): 6/10  Any medication changes, allergies to medications, adverse drug reactions, diagnosis change, or new procedure performed?: [x] No    [] Yes (see summary sheet for update)  Subjective functional status/changes:   [] No changes reported  \"I contacted the Orthopedist and I was told that the pain and swelling was normal with this and that it could take up to 12 months for this femur to heal.  I'm using the bone stimulator 3 hrs/day. I know that I have to work to get better and stronger so I'm sore but I'm just going to keep going. \"    OBJECTIVE  Modality rationale: decrease edema and decrease pain to improve the patients ability to ease soreness after therapy   Min Type Additional Details    [] Estim:  []Unatt       []IFC  []Premod                        []Other:  []w/ice   []w/heat  Position:  Location:    [] Estim: []Att    []TENS instruct  []NMES                    []Other:  []w/US   []w/ice   []w/heat  Position:  Location:    []  Traction: [] Cervical       []Lumbar                       [] Prone          []Supine                       []Intermittent   []Continuous Lbs:  [] before manual  [] after manual    []  Ultrasound: []Continuous   [] Pulsed                           []1MHz   []3MHz Location:  W/cm2:    []  Iontophoresis with dexamethasone         Location: [] Take home patch   [] In clinic   10 [x]  Ice     []  heat  []  Ice massage  []  Laser   []  Anodyne Position: supine with wedge  Location: left knee    []  Laser with stim  []  Other: Position:  Location:    []  Vasopneumatic Device Pressure:       [] lo [] med [] hi   Temperature: [] lo [] med [] hi   [x] Skin assessment post-treatment:  [x]intact []redness- no adverse reaction    []redness  adverse reaction:     29 min Therapeutic Exercise:  [x] See flow sheet :   Rationale: increase ROM, increase strength and improve coordination to improve the patients ability to increase ease with standing/ambulation tolerance    8 min Neuromuscular Re-education:  []  See flow sheet : gait with RW   Rationale: improve coordination and improve balance  to improve the patients ability to amb community distances            With   [] TE   [] TA   [] neuro   [] other: Patient Education: [x] Review HEP    [] Progressed/Changed HEP based on:   [] positioning   [] body mechanics   [] transfers   [] heat/ice application    [] other:      Other Objective/Functional Measures: Increased to BTB for resisted TKE, GTB for seated HS curls. Pain Level (0-10 scale) post treatment: 0/10    ASSESSMENT/Changes in Function: Pt able to perform exercises with progressions without increased pain or difficulty. Pt able to report no pain after modalities. Patient will continue to benefit from skilled PT services to modify and progress therapeutic interventions, address functional mobility deficits, address ROM deficits, address strength deficits, analyze and address soft tissue restrictions, analyze and cue movement patterns, analyze and modify body mechanics/ergonomics, assess and modify postural abnormalities, address imbalance/dizziness and instruct in home and community integration to attain remaining goals.      []  See Plan of Care  []  See progress note/recertification  []  See Discharge Summary         Progress towards goals / Updated goals:  Goal: Patient will improve FOTO assessment score to 54 in order to indicate improved functional abilities. Status at last note/certification: 35  Current status: progressing - FOTO 50 pts  Goal: Patient will be able to ambulate with walker community distances without pain increasing over 4/10 in order to progress toward normalized gait. Status at last note/certification: unable to ambulate currently  Current status: progressing - decreased pain today with ambulation several laps in clinic  Goal: Patient will improve bilateral LE strength to 5/5 without pain in order to prepare for full weight bearing. Status at last note/certification: Grossly 4+/5 to 5/5 with pain  Current status: progressing - grossly 5/5 with some continued discomfort  Goal: Patient will report worst LE pain as 5/10 or less in order to improve sleeping tolerance.   Status at last note/certification: 8/02  Current status: progressing - 6-7/10    PLAN  []  Upgrade activities as tolerated     [x]  Continue plan of care  []  Update interventions per flow sheet       []  Discharge due to:_  []  Other:_      Cinthya Mata PT 9/1/2017  10:51 AM    Future Appointments  Date Time Provider Lukasz Torres   9/6/2017 12:00  [address] [facility]   9/8/2017 12:00  [address] [facility]   9/11/2017 11:00  [address] [facility]   9/13/2017 11:00 AM [facility]   9/15/2017 11:00 AM Cathy Ymca Shareen Sandifer [facility]   9/18/2017 11:00 AM Sandhya Maldonado, PT [facility]YA [facility]   9/20/2017 11:00 AM [facility]   9/22/2017 11:30 AM Sandhya Maldonado PT [facility]   9/25/2017 11:00 AM [facility]   9/27/2017 11:00 AM Sandhya Maldonado [facility]   9/29/2017 11:00  [address] [facility]

## 2017-09-06 ENCOUNTER — APPOINTMENT (OUTPATIENT)
Dept: PHYSICAL THERAPY | Age: 62
End: 2017-09-06
Payer: COMMERCIAL

## 2017-09-08 ENCOUNTER — HOSPITAL ENCOUNTER (OUTPATIENT)
Dept: PHYSICAL THERAPY | Age: 62
Discharge: HOME | End: 2017-09-08
Payer: COMMERCIAL

## 2017-09-08 PROCEDURE — 97110 THERAPEUTIC EXERCISES: CPT
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
## 2017-09-08 NOTE — PROGRESS NOTES
PT DAILY TREATMENT NOTE     Patient Name: Twyla Jo  Date:2017  : 1955  [x]  Patient  Verified  Payor: Adalberto Peña / Plan: [address] [place] [address] / Product Type: PPO /    In time:12:00  Out time:12:55  Total Treatment Time (min): 55  Visit #: 6 of 10    Treatment Area: Displaced supracondylar fracture with intracondylar extension of lower end of left femur, subsequent encounter for closed fracture with routine healing [S72.467N]  Displaced fracture of posterior wall of right acetabulum, subsequent encounter for fracture with routine healing [S32.421D]    SUBJECTIVE  Pain Level (0-10 scale):  6  Any medication changes, allergies to medications, adverse drug reactions, diagnosis change, or new procedure performed?: [x] No    [] Yes (see summary sheet for update)  Subjective functional status/changes:   [] No changes reported  I could do more if I was abl eo put more weight on my L leg    OBJECTIVE    55 min Therapeutic Exercise:  [] See flow sheet :   Rationale: increase strength to improve the patients ability to walk and performADL's            With   [] TE   [] TA   [] neuro   [] other: Patient Education: [x] Review HEP    [] Progressed/Changed HEP based on:   [] positioning   [] body mechanics   [] transfers   [] heat/ice application    [] other:      Other Objective/Functional Measures:   Gains: improved standing for doing laundry, can sleep on R side now. Not as much pain.   Deficits: Pain in L knee, and limited WB on L LE (per MD) to allow for healing     Pain Level (0-10 scale) post treatment:  6    ASSESSMENT/Changes in Function:  See above    Patient will continue to benefit from skilled PT services to modify and progress therapeutic interventions, address functional mobility deficits, address ROM deficits, address strength deficits, analyze and address soft tissue restrictions, analyze and cue movement patterns, analyze and modify body mechanics/ergonomics, assess and modify postural abnormalities, address imbalance/dizziness and instruct in home and community integration to attain remaining goals. []  See Plan of Care  []  See progress note/recertification  []  See Discharge Summary         Progress towards goals / Updated goals:  Goal: Patient will improve FOTO assessment score to 54 in order to indicate improved functional abilities. Status at last note/certification: 35  Current status: progressing - FOTO 50 pts  Goal: Patient will be able to ambulate with walker community distances without pain increasing over 4/10 in order to progress toward normalized gait. Status at last note/certification: unable to ambulate currently  Current status: progressing - decreased pain today with ambulation several laps in clinic  Goal: Patient will improve bilateral LE strength to 5/5 without pain in order to prepare for full weight bearing. Status at last note/certification: Grossly 4+/5 to 5/5 with pain  Current status: progressing - grossly 5/5 with some continued discomfort  Goal: Patient will report worst LE pain as 5/10 or less in order to improve sleeping tolerance.   Status at last note/certification: 0/40  Current status: progressing - 6-7/10    PLAN  [x]  Upgrade activities as tolerated     []  Continue plan of care  []  Update interventions per flow sheet       []  Discharge due to:_  []  Other:_      Ginger De Jesus, RYAN 9/8/2017  12:22 PM    Future Appointments  Date Time Provider Lukasz Torres   9/13/2017 11:00  [address] [facility]   9/15/2017 11:00  [address] [facility]   9/19/2017 11:30  [address] [facility]   9/22/2017 11:30 AM Sandhya Maldonado [facility]   9/25/2017 11:00 AM Wendy Suarez [facility]   9/27/2017 11:00 AM Sandhya Maldonado PT [facility]   9/29/2017 11:00  [address] [facility]

## 2017-09-11 ENCOUNTER — APPOINTMENT (OUTPATIENT)
Dept: PHYSICAL THERAPY | Age: 62
End: 2017-09-11
Payer: COMMERCIAL

## 2017-09-13 ENCOUNTER — HOSPITAL ENCOUNTER (OUTPATIENT)
Dept: PHYSICAL THERAPY | Age: 62
Discharge: HOME | End: 2017-09-13
Payer: COMMERCIAL

## 2017-09-13 PROCEDURE — 97110 THERAPEUTIC EXERCISES: CPT

## 2017-09-13 NOTE — PROGRESS NOTES
PT DAILY TREATMENT NOTE     Patient Name: Humberto Herman  Date:2017  : 1955  [x]  Patient  Verified  Payor: Miradia Preston / Plan: [address] [place] [address] / Product Type: PPO /    In time:11;10  Out time:11;55  Total Treatment Time (min): 45  Visit #: 1 of 10    Treatment Area: Displaced supracondylar fracture with intracondylar extension of lower end of left femur, subsequent encounter for closed fracture with routine healing [S78.128S]  Displaced fracture of posterior wall of right acetabulum, subsequent encounter for fracture with routine healing [S39.660K]    SUBJECTIVE  Pain Level (0-10 scale): 6  Any medication changes, allergies to medications, adverse drug reactions, diagnosis change, or new procedure performed?: [x] No    [] Yes (see summary sheet for update)  Subjective functional status/changes:   [] No changes reported  Feeling stiff as I've missed a few dasy pf PT due tpEye surgery    OBJECTIVE    Modality rationale: decrease inflammation and decrease pain to improve the patients ability to tolerate WB activites   Min Type Additional Details    [] Estim:  []Unatt       []IFC  []Premod                        []Other:  []w/ice   []w/heat  Position:  Location:    [] Estim: []Att    []TENS instruct  []NMES                    []Other:  []w/US   []w/ice   []w/heat  Position:  Location:    []  Traction: [] Cervical       []Lumbar                       [] Prone          []Supine                       []Intermittent   []Continuous Lbs:  [] before manual  [] after manual    []  Ultrasound: []Continuous   [] Pulsed                           []1MHz   []3MHz W/cm2:  Location:    []  Iontophoresis with dexamethasone         Location: [] Take home patch   [] In clinic   10 [x]  Ice     []  heat  []  Ice massage  []  Laser   []  Anodyne Position: supine  Location:  L knee      []  Laser with stim  []  Other:  Position:  Location:    []  Vasopneumatic Device Pressure:       [] lo [] med [] hi Temperature: [] lo [] med [] hi   [x] Skin assessment post-treatment:  [x]intact []redness- no adverse reaction    []redness  adverse reaction:       35 min Therapeutic Exercise:  [] See flow sheet :   Rationale: increase strength to improve the patients ability to walk, tolerate WB, improve ease with ADL's          With   [] TE   [] TA   [] neuro   [] other: Patient Education: [x] Review HEP    [] Progressed/Changed HEP based on:   [] positioning   [] body mechanics   [] transfers   [] heat/ice application    [] other:      Other Objective/Functional Measures:      Pain Level (0-10 scale) post treatment:  5    ASSESSMENT/Changes in Function: Increasing L knee pain with walking,using rollator. Able to ambulate for about 150 feet before icreased pain. Patient will continue to benefit from skilled PT services to modify and progress therapeutic interventions, address functional mobility deficits, address ROM deficits, address strength deficits, analyze and address soft tissue restrictions, analyze and cue movement patterns, analyze and modify body mechanics/ergonomics, assess and modify postural abnormalities, address imbalance/dizziness and instruct in home and community integration to attain remaining goals. []  See Plan of Care  []  See progress note/recertification  []  See Discharge Summary         Progress towards goals / Updated goals:  Goal: Patient will report worst LE pain as 4/10 or less in order to improve sleeping tolerance. Status at last note/certification: 2-4/88  Goal: Patient will be able to ambulate at least 200 feet without AD without LOB in order to progress toward premorbid status. Status at last note/certification: Using walker per MD  Goal: Patient will improve FOTO assessment score to 54 in order to indicate improved functional abilities.   Status at last note/certification: 35  Goal: Patient will be able to perform 5 sit to stand transfers without UE support in order to indicate improved LE strength and stability.   Status at last note/certification: Unable     PLAN  [x]  Upgrade activities as tolerated     []  Continue plan of care  []  Update interventions per flow sheet       []  Discharge due to:_  []  Other:_      Golden Palaciosabundio, PTA 9/13/2017  12:04 PM    Future Appointments  Date Time Provider Lukasz Torres   9/15/2017 11:00  [address] [facility]   9/19/2017 11:30  [address] [facility]   9/22/2017 11:30 AM Sandhya Maldonado [facility]   9/25/2017 11:00 AM Monique Foster [facility]   9/27/2017 11:00 AM Sandhya Maldonado PT [facility]   9/29/2017 11:00  [address] [facility]

## 2017-09-15 ENCOUNTER — APPOINTMENT (OUTPATIENT)
Dept: PHYSICAL THERAPY | Age: 62
End: 2017-09-15
Payer: COMMERCIAL

## 2017-09-18 ENCOUNTER — APPOINTMENT (OUTPATIENT)
Dept: PHYSICAL THERAPY | Age: 62
End: 2017-09-18
Payer: COMMERCIAL

## 2017-09-19 ENCOUNTER — HOSPITAL ENCOUNTER (OUTPATIENT)
Dept: PHYSICAL THERAPY | Age: 62
Discharge: HOME | End: 2017-09-19
Payer: COMMERCIAL

## 2017-09-19 PROCEDURE — 97110 THERAPEUTIC EXERCISES: CPT

## 2017-09-19 NOTE — PROGRESS NOTES
PT DAILY TREATMENT NOTE     Patient Name: Magan Simmons  Date:2017  : 1955  [x]  Patient  Verified  Payor: Misael [place] / Plan: [address] [place] [address] / Product Type: PPO /    In time:11:35  Out time:12:30  Total Treatment Time (min): 55  Visit #: 2 of 10    Treatment Area: Displaced supracondylar fracture with intracondylar extension of lower end of left femur, subsequent encounter for closed fracture with routine healing [S72.163S]  Displaced fracture of posterior wall of right acetabulum, subsequent encounter for fracture with routine healing [S32.395P]    SUBJECTIVE  Pain Level (0-10 scale): 4  Any medication changes, allergies to medications, adverse drug reactions, diagnosis change, or new procedure performed?: [x] No    [] Yes (see summary sheet for update)  Subjective functional status/changes:   [] No changes reported  My knee isn't as bad right now.   I'm trying to walk more at home and just rest when my knee begins hurting    OBJECTIVE    Modality rationale: decrease pain to improve the patients ability to tolerance to activity, walking   Min Type Additional Details    [] Estim:  []Unatt       []IFC  []Premod                        []Other:  []w/ice   []w/heat  Position:  Location:    [] Estim: []Att    []TENS instruct  []NMES                    []Other:  []w/US   []w/ice   []w/heat  Position:  Location:    []  Traction: [] Cervical       []Lumbar                       [] Prone          []Supine                       []Intermittent   []Continuous Lbs:  [] before manual  [] after manual    []  Ultrasound: []Continuous   [] Pulsed                           []1MHz   []3MHz W/cm2:  Location:    []  Iontophoresis with dexamethasone         Location: [] Take home patch   [] In clinic   10 [x]  Ice     []  heat  []  Ice massage  []  Laser   []  Anodyne Position: supine  Location: L knee    []  Laser with stim  []  Other:  Position:  Location:    []  Vasopneumatic Device Pressure: [] lo [] med [] hi   Temperature: [] lo [] med [] hi   [x] Skin assessment post-treatment:  [x]intact []redness- no adverse reaction    []redness  adverse reaction:       45 min Therapeutic Exercise:  [] See flow sheet :   Rationale: increase strength and improve coordination to improve the patients ability to improve ease of mobility and activity tolerance          With   [] TE   [] TA   [] neuro   [] other: Patient Education: [x] Review HEP    [] Progressed/Changed HEP based on:   [] positioning   [] body mechanics   [] transfers   [] heat/ice application    [] other:      Other Objective/Functional Measures: progressed reps with step ups     Pain Level (0-10 scale) post treatment: 4    ASSESSMENT/Changes in Function: Able toincrease reps with step ups on L with minimal symptom increase. Patient will continue to benefit from skilled PT services to modify and progress therapeutic interventions, address functional mobility deficits, address ROM deficits, address strength deficits, analyze and address soft tissue restrictions, analyze and cue movement patterns, analyze and modify body mechanics/ergonomics, assess and modify postural abnormalities, address imbalance/dizziness and instruct in home and community integration to attain remaining goals. []  See Plan of Care  []  See progress note/recertification  []  See Discharge Summary         Progress towards goals / Updated goals:  Goal: Patient will report worst LE pain as 4/10 or less in order to improve sleeping tolerance. Status at last note/certification: 2-1/76  Goal: Patient will be able to ambulate at least 200 feet without AD without LOB in order to progress toward premorbid status. Status at last note/certification: Using walker per MD   continues to use Rollator per MD, but is trying to walk more at home. Goal: Patient will improve FOTO assessment score to 54 in order to indicate improved functional abilities.   Status at last note/certification: 35  Goal: Patient will be able to perform 5 sit to stand transfers without UE support in order to indicate improved LE strength and stability.   Status at last note/certification: Unable    PLAN  [x]  Upgrade activities as tolerated     []  Continue plan of care  []  Update interventions per flow sheet       []  Discharge due to:_  []  Other:_      Sean Turner, RYAN 9/19/2017  12:29 PM    Future Appointments  Date Time Provider Lukasz Torres   9/22/2017 11:30 AM Sandhya Maldonado PT [facility]   9/25/2017 11:00  [address] [facility]   9/27/2017 11:00 AM Sandhya Maldonado PT [facility]   9/29/2017 11:00  [address] [facility]

## 2017-09-20 ENCOUNTER — APPOINTMENT (OUTPATIENT)
Dept: PHYSICAL THERAPY | Age: 62
End: 2017-09-20
Payer: COMMERCIAL

## 2017-09-22 ENCOUNTER — HOSPITAL ENCOUNTER (OUTPATIENT)
Dept: PHYSICAL THERAPY | Age: 62
Discharge: HOME | End: 2017-09-22
Payer: COMMERCIAL

## 2017-09-22 ENCOUNTER — APPOINTMENT (OUTPATIENT)
Dept: PHYSICAL THERAPY | Age: 62
End: 2017-09-22
Payer: COMMERCIAL

## 2017-09-22 PROCEDURE — 97110 THERAPEUTIC EXERCISES: CPT

## 2017-09-22 NOTE — PROGRESS NOTES
PT DAILY TREATMENT NOTE     Patient Name: Umair Crum  Date:2017  : 1955  [x]  Patient  Verified  Payor: [employer] / Plan: [address] [place] [address] / Product Type: PPO /    In time:11:30  Out time:12:05  Total Treatment Time (min): 35  Visit #: 3 of 10    Treatment Area: Displaced supracondylar fracture with intracondylar extension of lower end of left femur, subsequent encounter for closed fracture with routine healing [S72.462O]  Displaced fracture of posterior wall of right acetabulum, subsequent encounter for fracture with routine healing [S32.421D]    SUBJECTIVE  Pain Level (0-10 scale): 3/10  Any medication changes, allergies to medications, adverse drug reactions, diagnosis change, or new procedure performed?: [x] No    [] Yes (see summary sheet for update)  Subjective functional status/changes:   [] No changes reported  \"I am hurting a little bit but I'm okay. I took pain meds before I came in today. \"    OBJECTIVE    35 min Therapeutic Exercise:  [] See flow sheet :   Rationale: increase strength and improve coordination to improve the patients ability to improve ease of mobility and activity tolerance          With   [] TE   [] TA   [] neuro   [] other: Patient Education: [x] Review HEP    [] Progressed/Changed HEP based on:   [] positioning   [] body mechanics   [] transfers   [] heat/ice application    [] other:      Other Objective/Functional Measures: Continued with progressed Rx program.  Initiated SLS on R LE. Pt stopped after laps and made phone call to son and left before completing session. Unsure if she had pain increase - didn't report. Pain Level (0-10 scale) post treatment: 3/10    ASSESSMENT/Changes in Function: Pt came into clinic today with Rollator and no wheelchair. Pt took seated breaks due to increased LE soreness. Will continue to progress program to increase LE strength.   Pt will go to self pay after next visit as Pt wants to continue with skilled PT to continue recovering from injuries/surgeries. Patient will continue to benefit from skilled PT services to modify and progress therapeutic interventions, address functional mobility deficits, address ROM deficits, address strength deficits, analyze and address soft tissue restrictions, analyze and cue movement patterns, analyze and modify body mechanics/ergonomics, assess and modify postural abnormalities, address imbalance/dizziness and instruct in home and community integration to attain remaining goals. []  See Plan of Care  []  See progress note/recertification  []  See Discharge Summary         Progress towards goals / Updated goals:  Goal: Patient will report worst LE pain as 4/10 or less in order to improve sleeping tolerance. Status at last note/certification: 9-1/43  Current status:  Goal: Patient will be able to ambulate at least 200 feet without AD without LOB in order to progress toward premorbid status. Status at last note/certification: Using walker per MD   Current status: progressing - continues to use Rollator per MD, but is trying to walk more at home. Goal: Patient will improve FOTO assessment score to 54 in order to indicate improved functional abilities. Status at last note/certification: 35  Current status: progressing - FOTO 42 pts  Goal: Patient will be able to perform 5 sit to stand transfers without UE support in order to indicate improved LE strength and stability.   Status at last note/certification: Unable  Current status:    PLAN  [x]  Upgrade activities as tolerated     []  Continue plan of care  []  Update interventions per flow sheet       []  Discharge due to:_  [x]  Other:_goals next visit      Elizabeth Maldonado, PT 9/22/2017  12:29 PM    Future Appointments  Date Time Provider Lukasz Torres   9/25/2017 11:00  [address] [facility]   9/27/2017 11:00 AM Sandhya Maldonado, PT [facility]   9/29/2017 11:00  [address] [facility]

## 2017-09-25 ENCOUNTER — HOSPITAL ENCOUNTER (OUTPATIENT)
Dept: PHYSICAL THERAPY | Age: 62
Discharge: HOME | End: 2017-09-25
Payer: COMMERCIAL

## 2017-09-25 PROCEDURE — 97110 THERAPEUTIC EXERCISES: CPT
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
## 2017-09-27 ENCOUNTER — HOSPITAL ENCOUNTER (OUTPATIENT)
Dept: PHYSICAL THERAPY | Age: 62
Discharge: HOME | End: 2017-09-27
Payer: COMMERCIAL

## 2017-09-27 PROCEDURE — 97110 THERAPEUTIC EXERCISES: CPT

## 2017-09-27 NOTE — PROGRESS NOTES
PT DAILY TREATMENT NOTE     Patient Name: Daniella Mead  Date:2017  : 1955  [x]  Patient  Verified  Payor: JAQUAN [employer] / Plan: [address] [place] [address] / Product Type: PPO /    In time:11:50  Out time:12;30  Total Treatment Time (min): 40  Visit #: 5 of 10    Treatment Area: Displaced supracondylar fracture with intracondylar extension of lower end of left femur, subsequent encounter for closed fracture with routine healing [S76.401M]  Displaced fracture of posterior wall of right acetabulum, subsequent encounter for fracture with routine healing [S38.898C]    SUBJECTIVE  Pain Level (0-10 scale): 5  Any medication changes, allergies to medications, adverse drug reactions, diagnosis change, or new procedure performed?: [x] No    [] Yes (see summary sheet for update)  Subjective functional status/changes:   [] No changes reported  I did too much walking here and at [facility]. My knee is really swollen.     OBJECTIVE    Modality rationale: decrease inflammation and decrease pain to improve the patients ability to tolerance to PT    Min Type Additional Details    [] Estim:  []Unatt       []IFC  []Premod                        []Other:  []w/ice   []w/heat  Position:  Location:    [] Estim: []Att    []TENS instruct  []NMES                    []Other:  []w/US   []w/ice   []w/heat  Position:  Location:    []  Traction: [] Cervical       []Lumbar                       [] Prone          []Supine                       []Intermittent   []Continuous Lbs:  [] before manual  [] after manual    []  Ultrasound: []Continuous   [] Pulsed                           []1MHz   []3MHz W/cm2:  Location:    []  Iontophoresis with dexamethasone         Location: [] Take home patch   [] In clinic   10 [x]  Ice     []  heat  []  Ice massage  []  Laser   []  Anodyne Position:  supine  Location: L knee    []  Laser with stim  []  Other:  Position:  Location:    []  Vasopneumatic Device Pressure:       [] lo [] med [] hi Temperature: [] lo [] med [] hi   [x] Skin assessment post-treatment:  [x]intact []redness- no adverse reaction    []redness  adverse reaction:       30 min Therapeutic Exercise:  [] See flow sheet :   Rationale: increase strength to improve the patients ability to walk with improved ease and stability for ADL's    With   [] TE   [] TA   [] neuro   [] other: Patient Education: [x] Review HEP    [] Progressed/Changed HEP based on:   [] positioning   [] body mechanics   [] transfers   [] heat/ice application    [] other:      Other Objective/Functional Measures: held ambulating as pt having increased left knee pain     Pain Level (0-10 scale) post treatment:  4    ASSESSMENT/Changes in Function: WB activities limited due to flare in L knee pain. Pt states more mobility with rollator    Patient will continue to benefit from skilled PT services to modify and progress therapeutic interventions, address functional mobility deficits, address ROM deficits, address strength deficits, analyze and address soft tissue restrictions, analyze and cue movement patterns, analyze and modify body mechanics/ergonomics, assess and modify postural abnormalities and address imbalance/dizziness to attain remaining goals. []  See Plan of Care  []  See progress note/recertification  []  See Discharge Summary         Progress towards goals / Updated goals:  Goal: Patient will report worst LE pain as 4/10 or less in order to improve sleeping tolerance. Status at last note/certification: 0-8/69  Current status: 8-9/10 but less frequent, mostly 6-7/10  Goal: Patient will be able to ambulate at least 200 feet without AD without LOB in order to progress toward premorbid status. Status at last note/certification: Using walker per MD   Current status: progressing - continues to use Rollator per MD, but is trying to walk more at home.   Goal: Patient will improve FOTO assessment score to 54 in order to indicate improved functional abilities. Status at last note/certification: 35  Current status: progressing - FOTO 42 pts  Goal: Patient will be able to perform 5 sit to stand transfers without UE support in order to indicate improved LE strength and stability.   Status at last note/certification: Unable  Current status:    PLAN  [x]  Upgrade activities as tolerated     []  Continue plan of care  []  Update interventions per flow sheet       []  Discharge due to:_  []  Other:_      Marzetta Sever, RYAN 9/27/2017  1:00 PM    Future Appointments  Date Time Provider Lukasz Torres   9/29/2017 11:00 AM [id number]

## 2017-09-28 NOTE — PROCEDURE NOTE: CLINICAL
PROCEDURE NOTE: YAG Capsulotomy OS. Diagnosis: Visually Significant PCO. Anesthesia: Topical. Prior to treatment, the risks/benefits/alternatives were discussed. The patient wished to proceed with procedure. Power = * mJ. Number of pulses = *. Patient tolerated procedure well. There were no complications. 1 gtt of Alphagan was instilled after laser. Post laser IOP = * mmHg. Post-procedure instructions given. Matilde Rosales

## 2017-09-29 ENCOUNTER — HOSPITAL ENCOUNTER (OUTPATIENT)
Dept: PHYSICAL THERAPY | Age: 62
Discharge: HOME | End: 2017-09-29
Payer: COMMERCIAL

## 2017-09-29 PROCEDURE — 97116 GAIT TRAINING THERAPY: CPT

## 2017-09-29 PROCEDURE — 97110 THERAPEUTIC EXERCISES: CPT

## 2017-09-29 NOTE — PROGRESS NOTES
PT DAILY TREATMENT NOTE 3-    Patient Name: Elodia Morris  Date:2017  : 1955  [x]  Patient  Verified  Payor: [employer] [place] / Plan: [address] [place] [address] / Product Type: PPO /    In time:11:00  Out time:11:43  Total Treatment Time (min): 43  Visit #: 6 of 10    Treatment Area: Displaced supracondylar fracture with intracondylar extension of lower end of left femur, subsequent encounter for closed fracture with routine healing [S72.200Z]  Displaced fracture of posterior wall of right acetabulum, subsequent encounter for fracture with routine healing [S32.421D]    SUBJECTIVE  Pain Level (0-10 scale): 5-610  Any medication changes, allergies to medications, adverse drug reactions, diagnosis change, or new procedure performed?: [x] No    [] Yes (see summary sheet for update)  Subjective functional status/changes:   [] No changes reported  \"The knee is a little better today; it is still swollen, but I have been icing it. \"    OBJECTIVE  Modality rationale: decrease inflammation and decrease pain to improve the patients ability to ease ADL tolerance   Min Type Additional Details    [] Estim:  []Unatt       []IFC  []Premod                        []Other:  []w/ice   []w/heat  Position:  Location:    [] Estim: []Att    []TENS instruct  []NMES                    []Other:  []w/US   []w/ice   []w/heat  Position:  Location:    []  Traction: [] Cervical       []Lumbar                       [] Prone          []Supine                       []Intermittent   []Continuous Lbs:  [] before manual  [] after manual    []  Ultrasound: []Continuous   [] Pulsed                           []1MHz   []3MHz Location:  W/cm2:    []  Iontophoresis with dexamethasone         Location: [] Take home patch   [] In clinic   10 [x]  Ice     []  heat  []  Ice massage  []  Laser   []  Anodyne Position: supine with wedge  Location: left knee    []  Laser with stim  []  Other: Position:  Location:    []  Vasopneumatic Device Pressure: [] lo [] med [] hi   Temperature: [] lo [] med [] hi   [x] Skin assessment post-treatment:  [x]intact []redness- no adverse reaction    []redness  adverse reaction:     23 min Therapeutic Exercise:  [x] See flow sheet :   Rationale: increase ROM, increase strength and improve coordination to improve the patients ability to increase ease with ADLs    10 min Gait Training:  ~200 feet with rollator on level surfaces with supervision   Rationale: to increase tolerance for community ambulation           With   [] TE   [] TA   [] neuro   [] other: Patient Education: [x] Review HEP    [] Progressed/Changed HEP based on:   [] positioning   [] body mechanics   [] transfers   [] heat/ice application    [] other:      Other Objective/Functional Measures:   Improved tolerance to left weight bear activities today   Patient provided with updated HEP today    Patient ambulates at rollator, demonstrates decreased knee flexion on R during swing phase and compensation for hip abductor weakness with trunk lean    Pain Level (0-10 scale) post treatment: 4    ASSESSMENT/Changes in Function:   Patient will be continuing with therapy 2x/2weeks with self pay. Patient continues with multiple and frequent c/o pain at multiple locations; however, presents with improved tolerance to LLE weight bear activities today as evidenced by able to participate in gait training and demonstrates improvement in functional B LE strength as evidenced by able to perform 5x sit<->stand without UE assist. Will continue to focus on increasing bilateral B LE strength for eventual progression to LRAD.     Patient will continue to benefit from skilled PT services to modify and progress therapeutic interventions, address functional mobility deficits, address ROM deficits, address strength deficits, analyze and address soft tissue restrictions, analyze and cue movement patterns, analyze and modify body mechanics/ergonomics, assess and modify postural abnormalities and instruct in home and community integration to attain remaining goals. []  See Plan of Care  []  See progress note/recertification  []  See Discharge Summary         Progress towards goals / Updated goals:  Goal: Patient will report worst LE pain as 4/10 or less in order to improve sleeping tolerance. Status at last note/certification: 1-7/45  Current status: 8-9/10 but less frequent, mostly 6-7/10  Goal: Patient will be able to ambulate at least 200 feet without AD without LOB in order to progress toward premorbid status. Status at last note/certification: Using walker per MD   Current status: progressing - continues to use Rollator per MD, but is trying to walk more at home. Goal: Patient will improve FOTO assessment score to 54 in order to indicate improved functional abilities. Status at last note/certification: 35  Current status: progressing - FOTO 42 pts  Goal: Patient will be able to perform 5 sit to stand transfers without UE support in order to indicate improved LE strength and stability. Status at last note/certification: Unable  Current status: met, patient able to perform sit<->stand x5 without UE assist, demonstrates good symmetrical weight bear (9/29/2017)    PLAN  []  Upgrade activities as tolerated     [x]  Continue plan of care  []  Update interventions per flow sheet       []  Discharge due to:_  []  Other:_      Syd  9/29/2017  11:00 AM    No future appointments.

## 2017-10-04 ENCOUNTER — HOSPITAL ENCOUNTER (OUTPATIENT)
Dept: PHYSICAL THERAPY | Age: 62
Discharge: HOME | End: 2017-10-04
Payer: COMMERCIAL

## 2017-10-04 PROCEDURE — 97110 THERAPEUTIC EXERCISES: CPT

## 2017-10-04 NOTE — PROGRESS NOTES
PT DAILY TREATMENT NOTE     Patient Name: Xavier Lopez  Date:10/4/2017  : 1955  [x]  Patient  Verified  Payor: [employer] / Plan: [address] [place] [address] / Product Type: PPO /    In time:11;00  Out time: 11;50  Total Treatment Time (min): 50  Visit #: 7 of 10    Treatment Area: Displaced supracondylar fracture with intracondylar extension of lower end of left femur, subsequent encounter for closed fracture with routine healing [S79.802D]  Displaced fracture of posterior wall of right acetabulum, subsequent encounter for fracture with routine healing [S33.824H]    SUBJECTIVE  Pain Level (0-10 scale): 5  Any medication changes, allergies to medications, adverse drug reactions, diagnosis change, or new procedure performed?: [x] No    [] Yes (see summary sheet for update)  Subjective functional status/changes:   [] No changes reported  I've been on my rollator for 4 days. So myl egs are sore, bu tits all good.     And I feel really stiff    OBJECTIVE    Modality rationale: decrease pain to improve the patients ability to improve activity tolerance   Min Type Additional Details    [] Estim:  []Unatt       []IFC  []Premod                        []Other:  []w/ice   []w/heat  Position:  Location:    [] Estim: []Att    []TENS instruct  []NMES                    []Other:  []w/US   []w/ice   []w/heat  Position:  Location:    []  Traction: [] Cervical       []Lumbar                       [] Prone          []Supine                       []Intermittent   []Continuous Lbs:  [] before manual  [] after manual    []  Ultrasound: []Continuous   [] Pulsed                           []1MHz   []3MHz W/cm2:  Location:    []  Iontophoresis with dexamethasone         Location: [] Take home patch   [] In clinic   10 [x]  Ice     []  heat  []  Ice massage  []  Laser   []  Anodyne Position: Supine  Location:  L knee    []  Laser with stim  []  Other:  Position:  Location:    []  Vasopneumatic Device Pressure:       [] lo [] med [] hi   Temperature: [] lo [] med [] hi   [x] Skin assessment post-treatment:  [x]intact []redness- no adverse reaction    []redness  adverse reaction:       40 min Therapeutic Exercise:  [] See flow sheet :   Rationale: increase strength to improve the patients ability to improve overall mobility, activity tolerance          With   [] TE   [] TA   [] neuro   [] other: Patient Education: [x] Review HEP    [] Progressed/Changed HEP based on:   [] positioning   [] body mechanics   [] transfers   [] heat/ice application    [] other:      Other Objective/Functional Measures:      Pain Level (0-10 scale) post treatment:  4    ASSESSMENT/Changes in Function: Pain in Left knee limits activity at times. However, pt is motivated and will push herself to walk more with rollaro and decrease dependence on WC    Patient will continue to benefit from skilled PT services to modify and progress therapeutic interventions, address functional mobility deficits, address ROM deficits, address strength deficits, analyze and address soft tissue restrictions, analyze and cue movement patterns, analyze and modify body mechanics/ergonomics, assess and modify postural abnormalities, address imbalance/dizziness and instruct in home and community integration to attain remaining goals. []  See Plan of Care  []  See progress note/recertification  []  See Discharge Summary         Progress towards goals / Updated goals:  Goal: Patient will report worst LE pain as 4/10 or less in order to improve sleeping tolerance. Status at last note/certification: 2-5/62  Current status: 8-9/10 but less frequent, mostly 6-7/10  Goal: Patient will be able to ambulate at least 200 feet without AD without LOB in order to progress toward premorbid status. Status at last note/certification: Using walker per MD   Current status: progressing - continues to use Rollator per MD, but is trying to walk more at home.   Goal: Patient will improve FOTO assessment score to 54 in order to indicate improved functional abilities. Status at last note/certification: 35  Current status: progressing - FOTO 42 pts  Goal: Patient will be able to perform 5 sit to stand transfers without UE support in order to indicate improved LE strength and stability.   Status at last note/certification: Unable  Current status: met, patient able to perform sit<->stand x5 without UE assist, demonstrates good symmetrical weight bear (9/29/2017    PLAN  [x]  Upgrade activities as tolerated     []  Continue plan of care  []  Update interventions per flow sheet       []  Discharge due to:_  []  Other:_      Kathrin Enriquez, PTA 10/4/2017  11:02 AM    Future Appointments  Date Time Provider Lukasz Torres   10/6/2017 11:00  [address] [facility]   10/9/2017 11:30 AM Charlies Case [facility]   10/11/2017 11:30 AM Sandhya Maldonado, PT Francis Reynoso

## 2017-10-06 ENCOUNTER — HOSPITAL ENCOUNTER (OUTPATIENT)
Dept: PHYSICAL THERAPY | Age: 62
End: 2017-10-06
Payer: COMMERCIAL

## 2017-10-09 ENCOUNTER — HOSPITAL ENCOUNTER (OUTPATIENT)
Dept: PHYSICAL THERAPY | Age: 62
Discharge: HOME | End: 2017-10-09
Payer: COMMERCIAL

## 2017-10-09 PROCEDURE — 97110 THERAPEUTIC EXERCISES: CPT

## 2017-10-09 NOTE — PROGRESS NOTES
PT DAILY TREATMENT NOTE     Patient Name: Ismael Risk  Date:10/9/2017  : 1955  [x]  Patient  Verified  Payor: Lizzy Rangel / Plan: [address] [place] [address] / Product Type: PPO /    In time:11:30  Out time:12:15  Total Treatment Time (min): 45  Visit #: 8 of 10    Treatment Area: Displaced supracondylar fracture with intracondylar extension of lower end of left femur, subsequent encounter for closed fracture with routine healing [S72.461P]  Displaced fracture of posterior wall of right acetabulum, subsequent encounter for fracture with routine healing [S32.421D]    SUBJECTIVE  Pain Level (0-10 scale): -8/10  Any medication changes, allergies to medications, adverse drug reactions, diagnosis change, or new procedure performed?: [x] No    [] Yes (see summary sheet for update)  Subjective functional status/changes:   [x] No changes reported      OBJECTIVE    Modality rationale: decrease inflammation and decrease pain to improve the patients ability to perform ADLs without difficulty.    Min Type Additional Details    [] Estim:  []Unatt       []IFC  []Premod                        []Other:  []w/ice   []w/heat  Position:  Location:    [] Estim: []Att    []TENS instruct  []NMES                    []Other:  []w/US   []w/ice   []w/heat  Position:  Location:    []  Traction: [] Cervical       []Lumbar                       [] Prone          []Supine                       []Intermittent   []Continuous Lbs:  [] before manual  [] after manual    []  Ultrasound: []Continuous   [] Pulsed                           []1MHz   []3MHz W/cm2:  Location:    []  Iontophoresis with dexamethasone         Location: [] Take home patch   [] In clinic   10 [x]  Ice     []  heat  []  Ice massage  []  Laser   []  Anodyne Position:supine  Location:left knee    []  Laser with stim  []  Other:  Position:  Location:    []  Vasopneumatic Device Pressure:       [] lo [] med [] hi   Temperature: [] lo [] med [] hi   [x] Skin assessment post-treatment:  [x]intact [x]redness- no adverse reaction    []redness  adverse reaction:   35 min Therapeutic Exercise:  [] See flow sheet :   Rationale: increase ROM, increase strength, improve coordination, improve balance and increase proprioception to improve the patients ability to perform ADLs without difficulty. With   [] TE   [] TA   [] neuro   [] other: Patient Education: [x] Review HEP    [] Progressed/Changed HEP based on:   [] positioning   [] body mechanics   [] transfers   [] heat/ice application    [] other:      Other Objective/Functional Measures:      Pain Level (0-10 scale) post treatment: 5/10    ASSESSMENT/Changes in Function: Continued with current ex. Per flow sheet. Pt reported no increase in p! During or after therapy today. Patient will continue to benefit from skilled PT services to modify and progress therapeutic interventions, address functional mobility deficits, address ROM deficits, address strength deficits and analyze and address soft tissue restrictions to attain remaining goals. []  See Plan of Care  []  See progress note/recertification  []  See Discharge Summary         Progress towards goals / Updated goals:  Goal: Patient will report worst LE pain as 4/10 or less in order to improve sleeping tolerance. Status at last note/certification: 5-8/56  Current status: 8-9/10 but less frequent, mostly 6-7/10  Goal: Patient will be able to ambulate at least 200 feet without AD without LOB in order to progress toward premorbid status. Status at last note/certification: Using walker per MD   Current status: progressing - continues to use Rollator per MD, but is trying to walk more at home. Goal: Patient will improve FOTO assessment score to 54 in order to indicate improved functional abilities.   Status at last note/certification: 35  Current status: progressing - FOTO 42 pts  Goal: Patient will be able to perform 5 sit to stand transfers without UE support in order to indicate improved LE strength and stability.   Status at last note/certification: Unable    PLAN  [x]  Upgrade activities as tolerated     [x]  Continue plan of care  []  Update interventions per flow sheet       []  Discharge due to:_  []  Other:_      Anshul Deshpande PTA 10/9/2017  12:25 PM    Future Appointments  Date Time Provider Lukasz Torres   10/11/2017 11:30 AM Sandhya Maldonado, [facility]

## 2017-10-11 ENCOUNTER — HOSPITAL ENCOUNTER (OUTPATIENT)
Dept: PHYSICAL THERAPY | Age: 62
Discharge: HOME | End: 2017-10-11
Payer: COMMERCIAL

## 2017-10-11 PROCEDURE — 97110 THERAPEUTIC EXERCISES: CPT

## 2017-10-11 NOTE — PROGRESS NOTES
PT DAILY TREATMENT NOTE     Patient Name: Lucrecia Cowden  Date:10/11/2017  : 1955  [x]  Patient  Verified  Payor: Pops Cameron / Plan: [address] [place] [address] / Product Type: PPO /    In time:10:40  Out time:11:25  Total Treatment Time (min): 45  Visit #: 9 of 10    Treatment Area: Displaced supracondylar fracture with intracondylar extension of lower end of left femur, subsequent encounter for closed fracture with routine healing [S72.661H]  Displaced fracture of posterior wall of right acetabulum, subsequent encounter for fracture with routine healing [S30.387K]    SUBJECTIVE  Pain Level (0-10 scale): 6  Any medication changes, allergies to medications, adverse drug reactions, diagnosis change, or new procedure performed?: [x] No    [] Yes (see summary sheet for update)  Subjective functional status/changes:   [] No changes reported  I'm feeling stiff today but I did a lot yesterday at my house    OBJECTIVE    Modality rationale: decrease pain to improve the patients ability to walk with decreased pain   Min Type Additional Details    [] Estim:  []Unatt       []IFC  []Premod                        []Other:  []w/ice   []w/heat  Position:  Location:    [] Estim: []Att    []TENS instruct  []NMES                    []Other:  []w/US   []w/ice   []w/heat  Position:  Location:    []  Traction: [] Cervical       []Lumbar                       [] Prone          []Supine                       []Intermittent   []Continuous Lbs:  [] before manual  [] after manual    []  Ultrasound: []Continuous   [] Pulsed                           []1MHz   []3MHz W/cm2:  Location:    []  Iontophoresis with dexamethasone         Location: [] Take home patch   [] In clinic   10 [x]  Ice     []  heat  []  Ice massage  []  Laser   []  Anodyne Position: supine  Location: L knee    []  Laser with stim  []  Other:  Position:  Location:    []  Vasopneumatic Device Pressure:       [] lo [] med [] hi   Temperature: [] lo [] med [] hi   [x] Skin assessment post-treatment:  [x]intact []redness- no adverse reaction    []redness  adverse reaction:       35 min Therapeutic Exercise:  [] See flow sheet :   Rationale: increase strength to improve the patients ability to walk and perform daily tasks          With   [] TE   [] TA   [] neuro   [] other: Patient Education: [x] Review HEP    [] Progressed/Changed HEP based on:   [] positioning   [] body mechanics   [] transfers   [] heat/ice application    [] other:      Other Objective/Functional Measures:      Pain Level (0-10 scale) post treatment:  5    ASSESSMENT/Changes in Function: Pt is progressing with activity tolerance    Patient will continue to benefit from skilled PT services to modify and progress therapeutic interventions, address functional mobility deficits, address ROM deficits, address strength deficits, analyze and address soft tissue restrictions, analyze and cue movement patterns, analyze and modify body mechanics/ergonomics, assess and modify postural abnormalities, address imbalance/dizziness and instruct in home and community integration to attain remaining goals. []  See Plan of Care  []  See progress note/recertification  []  See Discharge Summary         Progress towards goals / Updated goals:  Goal: Patient will report worst LE pain as 4/10 or less in order to improve sleeping tolerance. Status at last note/certification: 7-5/01  Current status: 8-9/10 but less frequent, mostly 6-7/10  Goal: Patient will be able to ambulate at least 200 feet without AD without LOB in order to progress toward premorbid status. Status at last note/certification: Using walker per MD   Current status: progressing - continues to use Rollator per MD, but is trying to walk more at home. Goal: Patient will improve FOTO assessment score to 54 in order to indicate improved functional abilities.   Status at last note/certification: 35  Current status: progressing - FOTO 42 pts  Goal: Patient will be able to perform 5 sit to stand transfers without UE support in order to indicate improved LE strength and stability.   Status at last note/certification: Unable    PLAN  [x]  Upgrade activities as tolerated     []  Continue plan of care  []  Update interventions per flow sheet       []  Discharge due to:_  []  Other:_      Valentine Espinal, RYAN 10/11/2017  10:50 AM    Future Appointments  Date Time Provider Lukasz Torres   10/11/2017 11:30 AM [facility] Rojas

## 2017-10-16 ENCOUNTER — HOSPITAL ENCOUNTER (OUTPATIENT)
Dept: PHYSICAL THERAPY | Age: 62
Discharge: HOME | End: 2017-10-16
Payer: COMMERCIAL

## 2017-10-16 PROCEDURE — 97110 THERAPEUTIC EXERCISES: CPT
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
## 2017-10-16 NOTE — PROGRESS NOTES
PT DAILY TREATMENT NOTE     Patient Name: Lucrecia Cowden  Date:10/16/2017  : 1955  [x]  Patient  Verified  Payor: [employer] / Plan: [address] [place] [address] / Product Type: PPO /    In time:10:40  Out time:10:35  Total Treatment Time (min): 11:10  Visit #: 10 of 10    Treatment Area: Displaced supracondylar fracture with intracondylar extension of lower end of left femur, subsequent encounter for closed fracture with routine healing [S72.799O]  Displaced fracture of posterior wall of right acetabulum, subsequent encounter for fracture with routine healing [S32.421D]    SUBJECTIVE  Pain Level (0-10 scale):  6  Any medication changes, allergies to medications, adverse drug reactions, diagnosis change, or new procedure performed?: [x] No    [] Yes (see summary sheet for update)  Subjective functional status/changes:   [] No changes reported  I've been doing alot with my house. Stiff and sore now mostly in L knee, but my R HS cramps    OBJECTIVE    35 min Therapeutic Exercise:  [] See flow sheet :   Rationale: increase ROM and increase strength to improve the patients ability to perform walking, daily tasks          With   [] TE   [] TA   [] neuro   [] other: Patient Education: [x] Review HEP    [] Progressed/Changed HEP based on:   [] positioning   [] body mechanics   [] transfers   [] heat/ice application    [] other:      Other Objective/Functional Measures:   Deficits:  Limites walking due to pain in L knee > R, and muscle pain, cramping R HS. Using Rollator for mobility in home  Gains:  Standing tolerance, chair transfers, bending to the floor from sitting  Improved by:  60-70%    Pain Level (0-10 scale) post treatment: 5    ASSESSMENT/Changes in Function:  See goals       []  See Plan of Care  []  See progress note/recertification  [x]  See Discharge Summary         Progress towards goals / Updated goals:  Goal: Patient will report worst LE pain as 4/10 or less in order to improve sleeping tolerance. Status at last note/certification: 5-2/20  Current status: 8-9/10 but less frequent, mostly 6-7/10  Goal: Patient will be able to ambulate at least 200 feet without AD without LOB in order to progress toward premorbid status. Status at last note/certification: Using walker per MD   Current status: progressing - continues to use Rollator per MD, but is trying to walk more at home. Goal: Patient will improve FOTO assessment score to 54 in order to indicate improved functional abilities. Status at last note/certification: 35  Current status: progressing - FOTO 43 pts  Goal: Patient will be able to perform 5 sit to stand transfers without UE support in order to indicate improved LE strength and stability.   Status at last note/certification: Unable  Goal Met:  10 x without use of UE support    PLAN  []  Upgrade activities as tolerated     []  Continue plan of care  []  Update interventions per flow sheet       [x]  Discharge due to:_  []  Other:_      Cem Berrios PTA 10/16/2017  10:54 AM    Future Appointments  Date Time Provider Lukasz Torres   10/20/2017 11:00  [address] [facility]

## 2017-10-20 ENCOUNTER — APPOINTMENT (OUTPATIENT)
Dept: PHYSICAL THERAPY | Age: 62
End: 2017-10-20
Payer: COMMERCIAL

## 2019-09-10 ENCOUNTER — IMPORTED ENCOUNTER (OUTPATIENT)
Dept: URBAN - METROPOLITAN AREA CLINIC 1 | Facility: CLINIC | Age: 64
End: 2019-09-10

## 2019-09-10 PROBLEM — H43.813: Noted: 2019-09-10

## 2019-09-10 PROBLEM — H16.143: Noted: 2019-09-10

## 2019-09-10 PROBLEM — H25.813: Noted: 2019-09-10

## 2019-09-10 PROBLEM — H35.033: Noted: 2019-09-10

## 2019-09-10 PROBLEM — H35.371: Noted: 2019-09-10

## 2019-09-10 PROBLEM — H04.123: Noted: 2019-09-10

## 2019-09-10 PROCEDURE — 92014 COMPRE OPH EXAM EST PT 1/>: CPT

## 2019-09-10 PROCEDURE — 92015 DETERMINE REFRACTIVE STATE: CPT
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
## 2019-10-01 ENCOUNTER — IMPORTED ENCOUNTER (OUTPATIENT)
Dept: URBAN - METROPOLITAN AREA CLINIC 1 | Facility: CLINIC | Age: 64
End: 2019-10-01

## 2019-10-01 PROBLEM — H25.813: Noted: 2019-10-01

## 2019-10-01 PROCEDURE — 92136 OPHTHALMIC BIOMETRY: CPT
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
## 2019-10-09 ENCOUNTER — IMPORTED ENCOUNTER (OUTPATIENT)
Dept: URBAN - METROPOLITAN AREA CLINIC 1 | Facility: CLINIC | Age: 64
End: 2019-10-09

## 2019-10-09 PROBLEM — H25.812 COMBINED FORM OF SENILE CATARACT OF LEFT EYE: Status: RESOLVED | Noted: 2019-10-09 | Resolved: 2019-10-09

## 2019-10-09 PROBLEM — H25.812 COMBINED FORM OF SENILE CATARACT OF LEFT EYE: Status: ACTIVE | Noted: 2019-10-09

## 2019-10-10 ENCOUNTER — IMPORTED ENCOUNTER (OUTPATIENT)
Dept: URBAN - METROPOLITAN AREA CLINIC 1 | Facility: CLINIC | Age: 64
End: 2019-10-10

## 2019-10-10 PROBLEM — Z96.1: Noted: 2019-10-10

## 2019-10-10 PROCEDURE — 99024 POSTOP FOLLOW-UP VISIT: CPT

## 2019-10-10 NOTE — PATIENT DISCUSSION
POD#1 CE/IOL OS (Standard w/ LenSx) doing well. Use Prednisolone BID OS Prolensa Qdaily OS Ocuflox TID OS : Use all three gtts through completion of PO gtt chart regimen/ Per our instructions given to patient.   Post op Warnings Reiterated RTC as scheduled

## 2019-10-15 ENCOUNTER — IMPORTED ENCOUNTER (OUTPATIENT)
Dept: URBAN - METROPOLITAN AREA CLINIC 1 | Facility: CLINIC | Age: 64
End: 2019-10-15

## 2019-10-15 PROBLEM — H25.811: Noted: 2019-10-15

## 2019-10-15 PROCEDURE — 92136 OPHTHALMIC BIOMETRY: CPT
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
## 2019-10-23 ENCOUNTER — IMPORTED ENCOUNTER (OUTPATIENT)
Dept: URBAN - METROPOLITAN AREA CLINIC 1 | Facility: CLINIC | Age: 64
End: 2019-10-23

## 2019-10-23 PROBLEM — H25.811 COMBINED FORM OF SENILE CATARACT OF RIGHT EYE: Status: ACTIVE | Noted: 2019-10-23

## 2019-10-23 PROBLEM — H25.811 COMBINED FORM OF SENILE CATARACT OF RIGHT EYE: Status: RESOLVED | Noted: 2019-10-23 | Resolved: 2019-10-23

## 2019-10-24 ENCOUNTER — IMPORTED ENCOUNTER (OUTPATIENT)
Dept: URBAN - METROPOLITAN AREA CLINIC 1 | Facility: CLINIC | Age: 64
End: 2019-10-24

## 2019-10-24 PROBLEM — Z96.1: Noted: 2019-10-24

## 2019-10-24 PROCEDURE — 99024 POSTOP FOLLOW-UP VISIT: CPT

## 2019-10-24 NOTE — PATIENT DISCUSSION
1. POD#1 Phaco/ PCL OD (Standard w/ LenSx)- doing well. Use Prednisolone BID OD Prolensa Qdaily OD Ocuflox TID OD : Use all three gtts through completion of PO gtt chart regimen/ Per our instructions given. Post op Warnings Reiterated 2. POW#3 Phaco/ PCL OS (Standard w/ LenSx)- doing well Use Prednisolone BID OS && Prolensa Qdaily OS: Use through completion of po gtt regimen.  RTC as scheduled

## 2019-11-14 ENCOUNTER — IMPORTED ENCOUNTER (OUTPATIENT)
Dept: URBAN - METROPOLITAN AREA CLINIC 1 | Facility: CLINIC | Age: 64
End: 2019-11-14

## 2019-11-14 PROBLEM — Z96.1: Noted: 2019-11-14

## 2019-11-14 PROCEDURE — 99024 POSTOP FOLLOW-UP VISIT: CPT

## 2019-11-14 NOTE — PATIENT DISCUSSION
POM#1 CE/IOL OU (Standard w/ LenSx OU) doing well. Decrease Prednisolone QD OD & Prolensa Qdaily OD until gone Restart Xiidra BID OU until gone (will not refill due to cost)  MRX for glasses given. Return for an appointment in 4 months 27 with Dr. Erik Nance.

## 2020-09-28 ENCOUNTER — IMPORTED ENCOUNTER (OUTPATIENT)
Dept: URBAN - METROPOLITAN AREA CLINIC 1 | Facility: CLINIC | Age: 65
End: 2020-09-28

## 2020-09-28 PROBLEM — H04.123: Noted: 2020-09-28

## 2020-09-28 PROBLEM — H35.371: Noted: 2020-09-28

## 2020-09-28 PROBLEM — H01.004: Noted: 2020-09-28

## 2020-09-28 PROBLEM — Z96.1: Noted: 2020-09-28

## 2020-09-28 PROBLEM — H16.143: Noted: 2020-09-28

## 2020-09-28 PROBLEM — H01.001: Noted: 2020-09-28

## 2020-09-28 PROCEDURE — 92015 DETERMINE REFRACTIVE STATE: CPT

## 2020-09-28 PROCEDURE — 92014 COMPRE OPH EXAM EST PT 1/>: CPT
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
## 2022-01-31 ENCOUNTER — IMPORTED ENCOUNTER (OUTPATIENT)
Dept: URBAN - METROPOLITAN AREA CLINIC 1 | Facility: CLINIC | Age: 67
End: 2022-01-31

## 2022-01-31 PROBLEM — H16.143: Noted: 2022-01-31

## 2022-01-31 PROBLEM — Z96.1: Noted: 2022-01-31

## 2022-01-31 PROBLEM — H35.371: Noted: 2022-01-31

## 2022-01-31 PROBLEM — H04.123: Noted: 2022-01-31

## 2022-01-31 PROCEDURE — 99214 OFFICE O/P EST MOD 30 MIN: CPT

## 2022-01-31 PROCEDURE — 92015 DETERMINE REFRACTIVE STATE: CPT
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
## 2022-04-02 ASSESSMENT — TONOMETRY
OD_IOP_MMHG: 15
OS_IOP_MMHG: 14
OD_IOP_MMHG: 14
OD_IOP_MMHG: 16
OS_IOP_MMHG: 16
OS_IOP_MMHG: 15
OS_IOP_MMHG: 14
OS_IOP_MMHG: 14
OD_IOP_MMHG: 14
OS_IOP_MMHG: 15
OS_IOP_MMHG: 16
OD_IOP_MMHG: 14

## 2022-04-02 ASSESSMENT — VISUAL ACUITY
OD_GLARE: 20/400
OD_CC: 20/40
OS_CC: 20/25
OS_GLARE: 20/200
OS_SC: 20/25-2
OS_GLARE: 20/60
OS_CC: 20/20
OS_CC: 20/30
OD_SC: 20/40+1
OS_SC: 20/25-2
OS_GLARE: 20/200
OS_CC: 20/25
OD_SC: 20/40
OD_SC: 20/40+1
OD_CC: 20/70
OD_CC: 20/30-2
OD_CC: 20/50
OS_CC: 20/50
OS_CC: 20/20-1
OD_CC: J2
OD_GLARE: 20/100
OD_GLARE: 20/400
OS_SC: 20/25
OD_CC: 20/70
OS_CC: J2
OS_CC: 20/50

## 2022-09-30 NOTE — PATIENT DISCUSSION
Epiretinal Membrane OD - Observe for change. Admission Reconciliation is Completed  Discharge Reconciliation is Not Complete Admission Reconciliation is Completed  Discharge Reconciliation is Completed

## 2023-01-31 RX ORDER — HYOSCYAMINE SULFATE, METHENAMINE, METHYLENE BLUE, PHENYL SALICYLATE, AND SODIUM PHOSPHATE, MONOBASIC, MONOHYDRATE .12; 81; 10.8; 32.4; 40.8 MG/1; MG/1; MG/1; MG/1; MG/1
1 TABLET ORAL
COMMUNITY
Start: 2015-12-08

## 2023-01-31 RX ORDER — OXYCODONE HYDROCHLORIDE AND ACETAMINOPHEN 5; 325 MG/1; MG/1
1 TABLET ORAL EVERY 6 HOURS PRN
COMMUNITY
Start: 2015-12-08

## 2023-01-31 RX ORDER — ASCORBIC ACID 500 MG
500 TABLET ORAL
COMMUNITY
Start: 2017-05-11

## 2023-01-31 RX ORDER — METOPROLOL SUCCINATE 50 MG/1
TABLET, EXTENDED RELEASE ORAL
COMMUNITY
Start: 2017-06-20

## 2023-01-31 RX ORDER — METOPROLOL TARTRATE 50 MG/1
50 TABLET, FILM COATED ORAL DAILY
COMMUNITY

## 2023-01-31 RX ORDER — TIZANIDINE 2 MG/1
2 TABLET ORAL EVERY 6 HOURS PRN
COMMUNITY
Start: 2019-12-17

## 2023-01-31 RX ORDER — NAPROXEN 500 MG/1
500 TABLET ORAL 2 TIMES DAILY WITH MEALS
COMMUNITY
Start: 2019-12-17

## 2023-01-31 RX ORDER — OMEPRAZOLE 20 MG/1
20 CAPSULE, DELAYED RELEASE ORAL DAILY
COMMUNITY

## 2023-01-31 RX ORDER — BUPROPION HYDROCHLORIDE 150 MG/1
300 TABLET ORAL
COMMUNITY

## 2023-01-31 RX ORDER — ACETAMINOPHEN 325 MG/1
650 TABLET ORAL
COMMUNITY
Start: 2017-05-05

## 2023-01-31 RX ORDER — TRAZODONE HYDROCHLORIDE 100 MG/1
100 TABLET ORAL DAILY
COMMUNITY

## 2023-01-31 RX ORDER — CITALOPRAM 20 MG/1
TABLET ORAL DAILY
COMMUNITY

## 2024-03-29 ENCOUNTER — COMPREHENSIVE EXAM (OUTPATIENT)
Dept: URBAN - METROPOLITAN AREA CLINIC 1 | Facility: CLINIC | Age: 69
End: 2024-03-29

## 2024-03-29 DIAGNOSIS — H16.143: ICD-10-CM

## 2024-03-29 DIAGNOSIS — H35.033: ICD-10-CM

## 2024-03-29 DIAGNOSIS — H43.813: ICD-10-CM

## 2024-03-29 DIAGNOSIS — H35.371: ICD-10-CM

## 2024-03-29 DIAGNOSIS — Z96.1: ICD-10-CM

## 2024-03-29 DIAGNOSIS — H04.123: ICD-10-CM

## 2024-03-29 PROCEDURE — 92015 DETERMINE REFRACTIVE STATE: CPT

## 2024-03-29 PROCEDURE — 99214 OFFICE O/P EST MOD 30 MIN: CPT

## 2024-03-29 ASSESSMENT — VISUAL ACUITY
OD_SC: 20/40
OS_SC: J3
OS_SC: 20/40
OD_SC: J5

## 2024-03-29 ASSESSMENT — TONOMETRY
OS_IOP_MMHG: 14
OD_IOP_MMHG: 14